# Patient Record
Sex: FEMALE | Race: WHITE | NOT HISPANIC OR LATINO | Employment: FULL TIME | URBAN - METROPOLITAN AREA
[De-identification: names, ages, dates, MRNs, and addresses within clinical notes are randomized per-mention and may not be internally consistent; named-entity substitution may affect disease eponyms.]

---

## 2017-01-16 LAB
EXTERNAL ABO GROUPING: NORMAL
EXTERNAL ANTIBODY SCREEN: NEGATIVE
EXTERNAL HEPATITIS B SURFACE ANTIGEN: NEGATIVE
EXTERNAL HEPATITIS C AB: NEGATIVE
EXTERNAL RH FACTOR: POSITIVE
EXTERNAL RUBELLA QUALITATIVE: NORMAL
EXTERNAL SYPHILIS RPR SCREEN: NORMAL
HIV1 AB SPEC QL IA.RAPID: NEGATIVE

## 2017-04-05 ENCOUNTER — TELEPHONE (OUTPATIENT)
Dept: INTERNAL MEDICINE | Facility: CLINIC | Age: 32
End: 2017-04-05

## 2017-04-05 RX ORDER — LABETALOL 200 MG/1
200 TABLET, FILM COATED ORAL 2 TIMES DAILY
Qty: 20 TABLET | Refills: 0 | Status: SHIPPED | OUTPATIENT
Start: 2017-04-05 | End: 2017-06-23 | Stop reason: SDUPTHER

## 2017-04-05 RX ORDER — LABETALOL 100 MG/1
100 TABLET, FILM COATED ORAL 2 TIMES DAILY
Qty: 20 TABLET | Refills: 0 | Status: SHIPPED | OUTPATIENT
Start: 2017-04-05 | End: 2017-06-23 | Stop reason: SDUPTHER

## 2017-04-05 NOTE — TELEPHONE ENCOUNTER
Meds sent to pharmacy, message sent to patient, she needs to make a follow up appointment with Dr. Pena.  ----- Message from Jill Stapleton sent at 4/5/2017  8:50 AM EDT -----  Regarding: DR JEAN PENA    labetalol (NORMODYNE) 100 MG tablet - take 1 by mouth twice daily  This is to be take in addition to the 200 mg bid that has already been prescribed for a total of 300mg bid    labetalol (NORMODYNE) 200 MG tablet - take 1 by mouth twice daily    Patient needs 10 days [facility] in [place] until mail order arrives.  Shoshana agreed to override.  She will contact pharmacy earlier in future, giving time for fill and mailing of script.    [phone]

## 2017-06-23 RX ORDER — LABETALOL 200 MG/1
TABLET, FILM COATED ORAL
Qty: 180 TABLET | Refills: 0 | Status: ON HOLD | OUTPATIENT
Start: 2017-06-23 | End: 2017-07-26

## 2017-06-23 RX ORDER — LABETALOL 100 MG/1
TABLET, FILM COATED ORAL
Qty: 180 TABLET | Refills: 0 | Status: SHIPPED | OUTPATIENT
Start: 2017-06-23 | End: 2017-07-26 | Stop reason: HOSPADM

## 2017-07-26 ENCOUNTER — APPOINTMENT (OUTPATIENT)
Dept: ULTRASOUND IMAGING | Facility: HOSPITAL | Age: 32
End: 2017-07-26

## 2017-07-26 ENCOUNTER — HOSPITAL ENCOUNTER (OUTPATIENT)
Facility: HOSPITAL | Age: 32
Setting detail: OBSERVATION
Discharge: HOME | End: 2017-07-26
Attending: OBSTETRICS & GYNECOLOGY | Admitting: OBSTETRICS & GYNECOLOGY

## 2017-07-26 VITALS
RESPIRATION RATE: 18 BRPM | HEART RATE: 94 BPM | DIASTOLIC BLOOD PRESSURE: 89 MMHG | SYSTOLIC BLOOD PRESSURE: 134 MMHG | TEMPERATURE: 99.2 F | HEIGHT: 67 IN

## 2017-07-26 PROBLEM — Z34.90 PREGNANCY: Status: ACTIVE | Noted: 2017-07-26

## 2017-07-26 LAB
ALBUMIN SERPL-MCNC: 3.7 G/DL (ref 3.5–5.2)
ALBUMIN/GLOB SERPL: 1.3 G/DL
ALP SERPL-CCNC: 80 U/L (ref 39–117)
ALT SERPL W P-5'-P-CCNC: 21 U/L (ref 1–33)
ANION GAP SERPL CALCULATED.3IONS-SCNC: 15.7 MMOL/L
AST SERPL-CCNC: 12 U/L (ref 1–32)
BILIRUB SERPL-MCNC: <0.2 MG/DL (ref 0.1–1.2)
BUN BLD-MCNC: 11 MG/DL (ref 6–20)
BUN/CREAT SERPL: 12.1 (ref 7–25)
CALCIUM SPEC-SCNC: 9.4 MG/DL (ref 8.6–10.5)
CHLORIDE SERPL-SCNC: 102 MMOL/L (ref 98–107)
CO2 SERPL-SCNC: 20.3 MMOL/L (ref 22–29)
CREAT BLD-MCNC: 0.91 MG/DL (ref 0.57–1)
DEPRECATED RDW RBC AUTO: 46.1 FL (ref 37–54)
ERYTHROCYTE [DISTWIDTH] IN BLOOD BY AUTOMATED COUNT: 14.1 % (ref 11.7–13)
EXPIRATION DATE: NORMAL
EXTERNAL GROUP B STREP ANTIGEN: NEGATIVE
GFR SERPL CREATININE-BSD FRML MDRD: 71 ML/MIN/1.73
GLOBULIN UR ELPH-MCNC: 2.9 GM/DL
GLUCOSE BLD-MCNC: 88 MG/DL (ref 65–99)
HCT VFR BLD AUTO: 35 % (ref 35.6–45.5)
HGB BLD-MCNC: 11.7 G/DL (ref 11.9–15.5)
Lab: NORMAL
MCH RBC QN AUTO: 29.8 PG (ref 26.9–32)
MCHC RBC AUTO-ENTMCNC: 33.4 G/DL (ref 32.4–36.3)
MCV RBC AUTO: 89.1 FL (ref 80.5–98.2)
PLATELET # BLD AUTO: 209 10*3/MM3 (ref 140–500)
PMV BLD AUTO: 9.5 FL (ref 6–12)
POTASSIUM BLD-SCNC: 4.1 MMOL/L (ref 3.5–5.2)
PROT SERPL-MCNC: 6.6 G/DL (ref 6–8.5)
PROT UR STRIP-MCNC: NORMAL MG/DL
RBC # BLD AUTO: 3.93 10*6/MM3 (ref 3.9–5.2)
SODIUM BLD-SCNC: 138 MMOL/L (ref 136–145)
WBC NRBC COR # BLD: 10.41 10*3/MM3 (ref 4.5–10.7)

## 2017-07-26 PROCEDURE — 80053 COMPREHEN METABOLIC PANEL: CPT | Performed by: OBSTETRICS & GYNECOLOGY

## 2017-07-26 PROCEDURE — G0378 HOSPITAL OBSERVATION PER HR: HCPCS

## 2017-07-26 PROCEDURE — 59025 FETAL NON-STRESS TEST: CPT

## 2017-07-26 PROCEDURE — 76819 FETAL BIOPHYS PROFIL W/O NST: CPT

## 2017-07-26 PROCEDURE — 85027 COMPLETE CBC AUTOMATED: CPT | Performed by: OBSTETRICS & GYNECOLOGY

## 2017-07-26 RX ORDER — LABETALOL 200 MG/1
400 TABLET, FILM COATED ORAL 2 TIMES DAILY
Qty: 180 TABLET | Refills: 0 | Status: ON HOLD | OUTPATIENT
Start: 2017-07-26 | End: 2017-08-27

## 2017-07-26 NOTE — NURSING NOTE
Discharge instructions reviewed with patient.  Discussed fetal kick counts, leaking of fluids, vaginal bleeding.  Patient to continue to go to all regularly scheduled appointments.  Dose of labetalol should be increased to 400mg BID.  CMP and CBC drawn.  Dr. Morfin will notify if abnormal.  To continue to monitor BPs at home.  Patient to return to L&D or call on call MD if any questions or concerns.

## 2017-07-26 NOTE — NON STRESS TEST
Shirin Hernandes, a  at 35w2d with an CHAPINCITO of 2017, by Patient Reported, was seen at [facility] for a nonstress test.    Chief Complaint   Patient presents with   • Non-stress Test     Dr. Morfin called patient in.  Patient was just in office and got a 4/8 on her BPP.  Dr. Morfin would like prolonged monitoring, and repeat BPP.

## 2017-07-26 NOTE — DISCHARGE SUMMARY
.[facility]  Obstetric History and Physical    Chief Complaint   Patient presents with   • Non-stress Test     Dr. Morfin called patient in.  Patient was just in office and got a 4/8 on her BPP.  Dr. Morfin would like prolonged monitoring, and repeat BPP.       Subjective     Patient is a 31 y.o. female  currently at 35w2d, who presents from office due to bpp 4/8. CHTN and routine weekly bpp 4/8. Reports good FM. No vb. No lof    CHTN- on labetolol 300mg bid. Takes bp at home and 130s/80s.... Here today 130-170/90s. No si/sx preeclampsia.    Ulcerative colitis. No LDA.            Past OB History:       Obstetric History       T1      TAB0   SAB1   E0   M0   L1       # Outcome Date GA Lbr Michael/2nd Weight Sex Delivery Anes PTL Lv   3 Current            2 Term 09/11/15 37w4d   M CS-LTranv   Y   1 SAB 14                  Past Medical History: Past Medical History:   Diagnosis Date   • Anxiety    • GERD (gastroesophageal reflux disease)    • Headache    • Hx of migraines    • Hypertension    • NSAID sensitivity     Cannot take NSAIDs while taking LIALDA   • Peptic ulceration    • Ulcerative colitis       Past Surgical History Past Surgical History:   Procedure Laterality Date   •  SECTION  2015   • CHOLECYSTECTOMY WITH INTRAOPERATIVE CHOLANGIOGRAM N/A 2016    Procedure: CHOLECYSTECTOMY LAPAROSCOPIC WITH INTRAOPERATIVE CHOLANGIOGRAM AND LIVER BIOPSY ;  Surgeon: Dieudonne Lamb MD;  [place] [facility] [place] [facility]  Service:    • COLONOSCOPY W/ BIOPSIES N/A 2016    Dr. Rom Wilks   • D&C CERVICAL BIOPSY  2014      Family History: Family History   Problem Relation Age of Onset   • Myocarditis Mother    • Hypertension Father    • Diabetes Maternal Uncle    • Aneurysm Paternal Aunt    • Diabetes Maternal Grandmother    • Diabetes Maternal Grandfather    • Alcohol abuse Paternal Grandfather    • Diabetes Maternal Uncle       Social History:  reports that she has never  smoked. She does not have any smokeless tobacco history on file.   reports that she does not drink alcohol.   reports that she does not use illicit drugs.    Allergies:     Amoxicillin and Benzoyl peroxide       Objective       Vital Signs Range for the last 24 hours  Temperature: Temp:  [99.2 °F (37.3 °C)] 99.2 °F (37.3 °C)   Temp Source: Temp src: Oral   BP: BP: (138-173)/(72-97) 148/92   Pulse: Heart Rate:  [84-98] 94   Respirations: Resp:  [18] 18                   Physical Examination:     General :  Alert in NAD  Abdomen: Gravid, nontender                            Fetal Heart Rate Assessment   Method: Fetal HR Assessment Method: external   Beats/min: Fetal HR (Beats/Min): 145   Baseline: Fetal HR Baseline: normal range (110-160 bpm)   Varibility: Fetal HR Variability: moderate (amplitude range 6 to 25 bpm)   Accels: Fetal HR Accelerations: greater than/equal to 15 bpm, lasting at least 15 seconds   Decels: Fetal HR Decelerations: absent   Tracing Category: Fetal HR Tracing Category: Category I     Uterine Assessment   Method: Method: TOCO (external toco transducer)   Frequency (min):     Ctx Count in 10 min:     Duration:     Intensity: Contraction Intensity: no contractions   Intensity by IUPC:     Resting Tone: Uterine Resting Tone: soft by palpation   Resting Tone by IUPC:     Abhay Units:         bpp in aimee 8/8 with reactive nst so 10/10      Assessment/Plan       Assessment:  1.  Intrauterine pregnancy at 35w2d weeks gestation with reassuring fetal status.    2.  bpp 4/8 in office--- here 10/10  3. CHTN with inc bp today. No si/sx preE.  Will inc to labetolol 400mg bid. Cont home bp monitors. Baseline cbc/cmp now. Keep f/u appt in office as scheduled    Plan:   Plan of care has been reviewed with patient and family,.   All questions answered.          Office prenatal reviewed        Jade Morfin MD  7/26/2017  6:18 PM

## 2017-08-24 ENCOUNTER — HOSPITAL ENCOUNTER (INPATIENT)
Facility: HOSPITAL | Age: 32
LOS: 3 days | Discharge: HOME | End: 2017-08-27
Attending: OBSTETRICS & GYNECOLOGY | Admitting: OBSTETRICS & GYNECOLOGY

## 2017-08-24 ENCOUNTER — ANESTHESIA (OUTPATIENT)
Dept: LABOR AND DELIVERY | Facility: HOSPITAL | Age: 32
End: 2017-08-24

## 2017-08-24 ENCOUNTER — ANESTHESIA EVENT (OUTPATIENT)
Dept: LABOR AND DELIVERY | Facility: HOSPITAL | Age: 32
End: 2017-08-24

## 2017-08-24 PROBLEM — O34.219 PREVIOUS CESAREAN DELIVERY, ANTEPARTUM: Status: ACTIVE | Noted: 2017-08-24

## 2017-08-24 LAB
ABO GROUP BLD: NORMAL
ATMOSPHERIC PRESS: 752.9 MMHG
BASE EXCESS BLDCOA CALC-SCNC: -2.4 MMOL/L
BASOPHILS # BLD AUTO: 0.02 10*3/MM3 (ref 0–0.2)
BASOPHILS NFR BLD AUTO: 0.2 % (ref 0–1.5)
BDY SITE: ABNORMAL
BLD GP AB SCN SERPL QL: NEGATIVE
DEPRECATED RDW RBC AUTO: 47.2 FL (ref 37–54)
EOSINOPHIL # BLD AUTO: 0.19 10*3/MM3 (ref 0–0.7)
EOSINOPHIL NFR BLD AUTO: 2 % (ref 0.3–6.2)
ERYTHROCYTE [DISTWIDTH] IN BLOOD BY AUTOMATED COUNT: 14.6 % (ref 11.7–13)
GAS FLOW AIRWAY: 2 LPM
HCO3 BLDCOA-SCNC: 25.4 MMOL/L (ref 22–28)
HCT VFR BLD AUTO: 37.2 % (ref 35.6–45.5)
HGB BLD-MCNC: 12.1 G/DL (ref 11.9–15.5)
IMM GRANULOCYTES # BLD: 0.11 10*3/MM3 (ref 0–0.03)
IMM GRANULOCYTES NFR BLD: 1.1 % (ref 0–0.5)
LYMPHOCYTES # BLD AUTO: 2.14 10*3/MM3 (ref 0.9–4.8)
LYMPHOCYTES NFR BLD AUTO: 22.2 % (ref 19.6–45.3)
MCH RBC QN AUTO: 29.2 PG (ref 26.9–32)
MCHC RBC AUTO-ENTMCNC: 32.5 G/DL (ref 32.4–36.3)
MCV RBC AUTO: 89.6 FL (ref 80.5–98.2)
MODALITY: ABNORMAL
MONOCYTES # BLD AUTO: 0.65 10*3/MM3 (ref 0.2–1.2)
MONOCYTES NFR BLD AUTO: 6.7 % (ref 5–12)
NEUTROPHILS # BLD AUTO: 6.53 10*3/MM3 (ref 1.9–8.1)
NEUTROPHILS NFR BLD AUTO: 67.8 % (ref 42.7–76)
PCO2 BLDCOA: 54.7 MMHG
PH BLDCOA: 7.28 [PH] (ref 7.18–7.34)
PLAT MORPH BLD: NORMAL
PLATELET # BLD AUTO: 226 10*3/MM3 (ref 140–500)
PMV BLD AUTO: 9.6 FL (ref 6–12)
PO2 BLDCOA: <25.2 MMHG (ref 12–26)
POC ALBUMIN: NEGATIVE
RBC # BLD AUTO: 4.15 10*6/MM3 (ref 3.9–5.2)
RBC MORPH BLD: NORMAL
RH BLD: POSITIVE
SAO2 % BLDCOA: 14.7 % (ref 92–99)
WBC MORPH BLD: NORMAL
WBC NRBC COR # BLD: 9.64 10*3/MM3 (ref 4.5–10.7)

## 2017-08-24 PROCEDURE — 86901 BLOOD TYPING SEROLOGIC RH(D): CPT | Performed by: OBSTETRICS & GYNECOLOGY

## 2017-08-24 PROCEDURE — 86850 RBC ANTIBODY SCREEN: CPT | Performed by: OBSTETRICS & GYNECOLOGY

## 2017-08-24 PROCEDURE — 25010000002 ONDANSETRON PER 1 MG: Performed by: ANESTHESIOLOGY

## 2017-08-24 PROCEDURE — 25010000002 PHENYLEPHRINE PER 1 ML: Performed by: NURSE ANESTHETIST, CERTIFIED REGISTERED

## 2017-08-24 PROCEDURE — 25010000002 MORPHINE PER 10 MG: Performed by: NURSE ANESTHETIST, CERTIFIED REGISTERED

## 2017-08-24 PROCEDURE — 81002 URINALYSIS NONAUTO W/O SCOPE: CPT | Performed by: OBSTETRICS & GYNECOLOGY

## 2017-08-24 PROCEDURE — 94799 UNLISTED PULMONARY SVC/PX: CPT

## 2017-08-24 PROCEDURE — 86900 BLOOD TYPING SEROLOGIC ABO: CPT | Performed by: OBSTETRICS & GYNECOLOGY

## 2017-08-24 PROCEDURE — 63710000001 DIPHENHYDRAMINE PER 50 MG: Performed by: ANESTHESIOLOGY

## 2017-08-24 PROCEDURE — 85007 BL SMEAR W/DIFF WBC COUNT: CPT | Performed by: OBSTETRICS & GYNECOLOGY

## 2017-08-24 PROCEDURE — 82803 BLOOD GASES ANY COMBINATION: CPT

## 2017-08-24 PROCEDURE — 10907ZC DRAINAGE OF AMNIOTIC FLUID, THERAPEUTIC FROM PRODUCTS OF CONCEPTION, VIA NATURAL OR ARTIFICIAL OPENING: ICD-10-PCS | Performed by: OBSTETRICS & GYNECOLOGY

## 2017-08-24 PROCEDURE — 85025 COMPLETE CBC W/AUTO DIFF WBC: CPT | Performed by: OBSTETRICS & GYNECOLOGY

## 2017-08-24 RX ORDER — ONDANSETRON 4 MG/1
4 TABLET, ORALLY DISINTEGRATING ORAL EVERY 6 HOURS PRN
Status: DISCONTINUED | OUTPATIENT
Start: 2017-08-24 | End: 2017-08-27 | Stop reason: HOSPADM

## 2017-08-24 RX ORDER — METHYLERGONOVINE MALEATE 0.2 MG/ML
200 INJECTION INTRAVENOUS AS NEEDED
Status: DISCONTINUED | OUTPATIENT
Start: 2017-08-24 | End: 2017-08-27 | Stop reason: HOSPADM

## 2017-08-24 RX ORDER — ACETAMINOPHEN 500 MG
1000 TABLET ORAL ONCE
Status: COMPLETED | OUTPATIENT
Start: 2017-08-24 | End: 2017-08-24

## 2017-08-24 RX ORDER — MISOPROSTOL 200 UG/1
800 TABLET ORAL AS NEEDED
Status: DISCONTINUED | OUTPATIENT
Start: 2017-08-24 | End: 2017-08-27 | Stop reason: HOSPADM

## 2017-08-24 RX ORDER — ERYTHROMYCIN 5 MG/G
OINTMENT OPHTHALMIC
Status: DISPENSED
Start: 2017-08-24 | End: 2017-08-24

## 2017-08-24 RX ORDER — FAMOTIDINE 10 MG/ML
20 INJECTION, SOLUTION INTRAVENOUS ONCE AS NEEDED
Status: COMPLETED | OUTPATIENT
Start: 2017-08-24 | End: 2017-08-24

## 2017-08-24 RX ORDER — MAGNESIUM OXIDE/MAG AA CHELATE 300 MG
300 CAPSULE ORAL
COMMUNITY
End: 2018-10-12

## 2017-08-24 RX ORDER — ACETAMINOPHEN 325 MG/1
650 TABLET ORAL EVERY 4 HOURS PRN
Status: DISCONTINUED | OUTPATIENT
Start: 2017-08-24 | End: 2017-08-27 | Stop reason: HOSPADM

## 2017-08-24 RX ORDER — MORPHINE SULFATE 2 MG/ML
2 INJECTION, SOLUTION INTRAMUSCULAR; INTRAVENOUS EVERY 4 HOURS PRN
Status: DISCONTINUED | OUTPATIENT
Start: 2017-08-24 | End: 2017-08-27 | Stop reason: HOSPADM

## 2017-08-24 RX ORDER — LABETALOL 200 MG/1
400 TABLET, FILM COATED ORAL 2 TIMES DAILY
Status: DISCONTINUED | OUTPATIENT
Start: 2017-08-24 | End: 2017-08-27 | Stop reason: HOSPADM

## 2017-08-24 RX ORDER — SODIUM CHLORIDE, SODIUM LACTATE, POTASSIUM CHLORIDE, CALCIUM CHLORIDE 600; 310; 30; 20 MG/100ML; MG/100ML; MG/100ML; MG/100ML
125 INJECTION, SOLUTION INTRAVENOUS CONTINUOUS
Status: DISCONTINUED | OUTPATIENT
Start: 2017-08-24 | End: 2017-08-26 | Stop reason: HOSPADM

## 2017-08-24 RX ORDER — CEFAZOLIN SODIUM IN 0.9 % NACL 3 G/100 ML
3 INTRAVENOUS SOLUTION, PIGGYBACK (ML) INTRAVENOUS ONCE
Status: COMPLETED | OUTPATIENT
Start: 2017-08-24 | End: 2017-08-24

## 2017-08-24 RX ORDER — OXYTOCIN/RINGER'S LACTATE 10/500ML
999 PLASTIC BAG, INJECTION (ML) INTRAVENOUS ONCE
Status: COMPLETED | OUTPATIENT
Start: 2017-08-24 | End: 2017-08-24

## 2017-08-24 RX ORDER — OXYTOCIN/RINGER'S LACTATE 10/500ML
125 PLASTIC BAG, INJECTION (ML) INTRAVENOUS CONTINUOUS PRN
Status: ACTIVE | OUTPATIENT
Start: 2017-08-24 | End: 2017-08-25

## 2017-08-24 RX ORDER — OXYCODONE HYDROCHLORIDE AND ACETAMINOPHEN 5; 325 MG/1; MG/1
1 TABLET ORAL EVERY 4 HOURS PRN
Status: DISCONTINUED | OUTPATIENT
Start: 2017-08-24 | End: 2017-08-27 | Stop reason: HOSPADM

## 2017-08-24 RX ORDER — ONDANSETRON 2 MG/ML
4 INJECTION INTRAMUSCULAR; INTRAVENOUS ONCE AS NEEDED
Status: DISCONTINUED | OUTPATIENT
Start: 2017-08-24 | End: 2017-08-27 | Stop reason: HOSPADM

## 2017-08-24 RX ORDER — DIPHENHYDRAMINE HCL 25 MG
25 CAPSULE ORAL EVERY 4 HOURS PRN
Status: DISCONTINUED | OUTPATIENT
Start: 2017-08-24 | End: 2017-08-27 | Stop reason: HOSPADM

## 2017-08-24 RX ORDER — ONDANSETRON 2 MG/ML
4 INJECTION INTRAMUSCULAR; INTRAVENOUS EVERY 6 HOURS PRN
Status: DISCONTINUED | OUTPATIENT
Start: 2017-08-24 | End: 2017-08-24 | Stop reason: SDUPTHER

## 2017-08-24 RX ORDER — FAMOTIDINE 20 MG/1
20 TABLET, FILM COATED ORAL ONCE AS NEEDED
Status: DISCONTINUED | OUTPATIENT
Start: 2017-08-24 | End: 2017-08-27 | Stop reason: HOSPADM

## 2017-08-24 RX ORDER — OXYTOCIN/RINGER'S LACTATE 10/500ML
999 PLASTIC BAG, INJECTION (ML) INTRAVENOUS ONCE
Status: DISCONTINUED | OUTPATIENT
Start: 2017-08-24 | End: 2017-08-27 | Stop reason: HOSPADM

## 2017-08-24 RX ORDER — ONDANSETRON 2 MG/ML
4 INJECTION INTRAMUSCULAR; INTRAVENOUS ONCE AS NEEDED
Status: DISCONTINUED | OUTPATIENT
Start: 2017-08-24 | End: 2017-08-24

## 2017-08-24 RX ORDER — DOCUSATE SODIUM 100 MG/1
100 CAPSULE, LIQUID FILLED ORAL 2 TIMES DAILY PRN
Status: DISCONTINUED | OUTPATIENT
Start: 2017-08-24 | End: 2017-08-27 | Stop reason: HOSPADM

## 2017-08-24 RX ORDER — DIPHENHYDRAMINE HCL 25 MG
25 CAPSULE ORAL EVERY 4 HOURS PRN
Status: DISCONTINUED | OUTPATIENT
Start: 2017-08-24 | End: 2017-08-24 | Stop reason: SDUPTHER

## 2017-08-24 RX ORDER — ONDANSETRON 4 MG/1
4 TABLET, ORALLY DISINTEGRATING ORAL EVERY 6 HOURS PRN
Status: DISCONTINUED | OUTPATIENT
Start: 2017-08-24 | End: 2017-08-24 | Stop reason: SDUPTHER

## 2017-08-24 RX ORDER — ONDANSETRON 2 MG/ML
4 INJECTION INTRAMUSCULAR; INTRAVENOUS EVERY 6 HOURS PRN
Status: DISCONTINUED | OUTPATIENT
Start: 2017-08-24 | End: 2017-08-27 | Stop reason: HOSPADM

## 2017-08-24 RX ORDER — LIDOCAINE HYDROCHLORIDE 10 MG/ML
5 INJECTION, SOLUTION INFILTRATION; PERINEURAL AS NEEDED
Status: DISCONTINUED | OUTPATIENT
Start: 2017-08-24 | End: 2017-08-24 | Stop reason: HOSPADM

## 2017-08-24 RX ORDER — ONDANSETRON 2 MG/ML
4 INJECTION INTRAMUSCULAR; INTRAVENOUS ONCE AS NEEDED
Status: COMPLETED | OUTPATIENT
Start: 2017-08-24 | End: 2017-08-24

## 2017-08-24 RX ORDER — EPHEDRINE SULFATE 50 MG/ML
INJECTION, SOLUTION INTRAVENOUS AS NEEDED
Status: DISCONTINUED | OUTPATIENT
Start: 2017-08-24 | End: 2017-08-24 | Stop reason: SURG

## 2017-08-24 RX ORDER — HYDROMORPHONE HYDROCHLORIDE 1 MG/ML
0.25 INJECTION, SOLUTION INTRAMUSCULAR; INTRAVENOUS; SUBCUTANEOUS
Status: ACTIVE | OUTPATIENT
Start: 2017-08-24 | End: 2017-08-25

## 2017-08-24 RX ORDER — DIPHENHYDRAMINE HYDROCHLORIDE 50 MG/ML
25 INJECTION INTRAMUSCULAR; INTRAVENOUS EVERY 4 HOURS PRN
Status: DISCONTINUED | OUTPATIENT
Start: 2017-08-24 | End: 2017-08-27 | Stop reason: HOSPADM

## 2017-08-24 RX ORDER — SODIUM CHLORIDE 0.9 % (FLUSH) 0.9 %
1-10 SYRINGE (ML) INJECTION AS NEEDED
Status: DISCONTINUED | OUTPATIENT
Start: 2017-08-24 | End: 2017-08-24 | Stop reason: HOSPADM

## 2017-08-24 RX ORDER — MORPHINE SULFATE 2 MG/ML
2 INJECTION, SOLUTION INTRAMUSCULAR; INTRAVENOUS
Status: ACTIVE | OUTPATIENT
Start: 2017-08-24 | End: 2017-08-25

## 2017-08-24 RX ORDER — PROMETHAZINE HYDROCHLORIDE 12.5 MG/1
12.5 TABLET ORAL EVERY 6 HOURS PRN
Status: DISCONTINUED | OUTPATIENT
Start: 2017-08-24 | End: 2017-08-27 | Stop reason: HOSPADM

## 2017-08-24 RX ORDER — ONDANSETRON 4 MG/1
4 TABLET, FILM COATED ORAL EVERY 6 HOURS PRN
Status: DISCONTINUED | OUTPATIENT
Start: 2017-08-24 | End: 2017-08-27 | Stop reason: HOSPADM

## 2017-08-24 RX ORDER — PROMETHAZINE HYDROCHLORIDE 25 MG/ML
12.5 INJECTION, SOLUTION INTRAMUSCULAR; INTRAVENOUS EVERY 6 HOURS PRN
Status: DISCONTINUED | OUTPATIENT
Start: 2017-08-24 | End: 2017-08-24 | Stop reason: SDUPTHER

## 2017-08-24 RX ORDER — ONDANSETRON 4 MG/1
4 TABLET, FILM COATED ORAL EVERY 6 HOURS PRN
Status: DISCONTINUED | OUTPATIENT
Start: 2017-08-24 | End: 2017-08-24 | Stop reason: SDUPTHER

## 2017-08-24 RX ORDER — SODIUM CHLORIDE 0.9 % (FLUSH) 0.9 %
1-10 SYRINGE (ML) INJECTION AS NEEDED
Status: DISCONTINUED | OUTPATIENT
Start: 2017-08-24 | End: 2017-08-27 | Stop reason: HOSPADM

## 2017-08-24 RX ORDER — IBUPROFEN 800 MG/1
800 TABLET ORAL EVERY 8 HOURS PRN
Status: DISCONTINUED | OUTPATIENT
Start: 2017-08-24 | End: 2017-08-24 | Stop reason: SDUPTHER

## 2017-08-24 RX ORDER — MORPHINE SULFATE 1 MG/ML
INJECTION, SOLUTION EPIDURAL; INTRATHECAL; INTRAVENOUS AS NEEDED
Status: DISCONTINUED | OUTPATIENT
Start: 2017-08-24 | End: 2017-08-24 | Stop reason: SURG

## 2017-08-24 RX ORDER — OXYTOCIN/RINGER'S LACTATE 10/500ML
125 PLASTIC BAG, INJECTION (ML) INTRAVENOUS CONTINUOUS PRN
Status: DISCONTINUED | OUTPATIENT
Start: 2017-08-24 | End: 2017-08-24 | Stop reason: HOSPADM

## 2017-08-24 RX ORDER — OXYCODONE HYDROCHLORIDE AND ACETAMINOPHEN 5; 325 MG/1; MG/1
2 TABLET ORAL EVERY 4 HOURS PRN
Status: DISCONTINUED | OUTPATIENT
Start: 2017-08-24 | End: 2017-08-27 | Stop reason: HOSPADM

## 2017-08-24 RX ORDER — IBUPROFEN 800 MG/1
800 TABLET ORAL EVERY 8 HOURS PRN
Status: DISCONTINUED | OUTPATIENT
Start: 2017-08-24 | End: 2017-08-24

## 2017-08-24 RX ORDER — MISOPROSTOL 200 UG/1
600 TABLET ORAL ONCE AS NEEDED
Status: DISCONTINUED | OUTPATIENT
Start: 2017-08-24 | End: 2017-08-24 | Stop reason: SDUPTHER

## 2017-08-24 RX ORDER — PROMETHAZINE HYDROCHLORIDE 25 MG/1
25 TABLET ORAL EVERY 6 HOURS PRN
Status: DISCONTINUED | OUTPATIENT
Start: 2017-08-24 | End: 2017-08-24 | Stop reason: SDUPTHER

## 2017-08-24 RX ORDER — BISACODYL 10 MG
10 SUPPOSITORY, RECTAL RECTAL DAILY PRN
Status: DISCONTINUED | OUTPATIENT
Start: 2017-08-24 | End: 2017-08-27 | Stop reason: HOSPADM

## 2017-08-24 RX ORDER — ACETAMINOPHEN 500 MG
1000 TABLET ORAL ONCE
Status: DISCONTINUED | OUTPATIENT
Start: 2017-08-24 | End: 2017-08-24

## 2017-08-24 RX ORDER — LANOLIN 100 %
OINTMENT (GRAM) TOPICAL
Status: DISCONTINUED | OUTPATIENT
Start: 2017-08-24 | End: 2017-08-27 | Stop reason: HOSPADM

## 2017-08-24 RX ORDER — NALOXONE HCL 0.4 MG/ML
0.2 VIAL (ML) INJECTION
Status: DISCONTINUED | OUTPATIENT
Start: 2017-08-24 | End: 2017-08-27 | Stop reason: HOSPADM

## 2017-08-24 RX ORDER — PROMETHAZINE HYDROCHLORIDE 25 MG/ML
12.5 INJECTION, SOLUTION INTRAMUSCULAR; INTRAVENOUS EVERY 6 HOURS PRN
Status: DISCONTINUED | OUTPATIENT
Start: 2017-08-24 | End: 2017-08-27 | Stop reason: HOSPADM

## 2017-08-24 RX ORDER — CARBOPROST TROMETHAMINE 250 UG/ML
250 INJECTION, SOLUTION INTRAMUSCULAR AS NEEDED
Status: DISCONTINUED | OUTPATIENT
Start: 2017-08-24 | End: 2017-08-27 | Stop reason: HOSPADM

## 2017-08-24 RX ORDER — NALOXONE HCL 0.4 MG/ML
0.4 VIAL (ML) INJECTION
Status: DISCONTINUED | OUTPATIENT
Start: 2017-08-24 | End: 2017-08-27 | Stop reason: HOSPADM

## 2017-08-24 RX ORDER — FAMOTIDINE 10 MG/ML
20 INJECTION, SOLUTION INTRAVENOUS ONCE AS NEEDED
Status: DISCONTINUED | OUTPATIENT
Start: 2017-08-24 | End: 2017-08-24 | Stop reason: SDUPTHER

## 2017-08-24 RX ORDER — PHYTONADIONE 2 MG/ML
INJECTION, EMULSION INTRAMUSCULAR; INTRAVENOUS; SUBCUTANEOUS
Status: DISPENSED
Start: 2017-08-24 | End: 2017-08-24

## 2017-08-24 RX ORDER — SIMETHICONE 80 MG
80 TABLET,CHEWABLE ORAL 4 TIMES DAILY PRN
Status: DISCONTINUED | OUTPATIENT
Start: 2017-08-24 | End: 2017-08-27 | Stop reason: HOSPADM

## 2017-08-24 RX ORDER — PROMETHAZINE HYDROCHLORIDE 12.5 MG/1
12.5 SUPPOSITORY RECTAL EVERY 6 HOURS PRN
Status: DISCONTINUED | OUTPATIENT
Start: 2017-08-24 | End: 2017-08-27 | Stop reason: HOSPADM

## 2017-08-24 RX ORDER — FAMOTIDINE 10 MG/ML
20 INJECTION, SOLUTION INTRAVENOUS ONCE AS NEEDED
Status: DISCONTINUED | OUTPATIENT
Start: 2017-08-24 | End: 2017-08-24

## 2017-08-24 RX ORDER — HYDROXYZINE 50 MG/1
50 TABLET, FILM COATED ORAL EVERY 6 HOURS PRN
Status: DISCONTINUED | OUTPATIENT
Start: 2017-08-24 | End: 2017-08-27 | Stop reason: HOSPADM

## 2017-08-24 RX ORDER — PROMETHAZINE HYDROCHLORIDE 12.5 MG/1
12.5 SUPPOSITORY RECTAL EVERY 6 HOURS PRN
Status: DISCONTINUED | OUTPATIENT
Start: 2017-08-24 | End: 2017-08-24 | Stop reason: SDUPTHER

## 2017-08-24 RX ORDER — MESALAMINE 1.2 G/1
1.2 TABLET, DELAYED RELEASE ORAL NIGHTLY
Status: DISCONTINUED | OUTPATIENT
Start: 2017-08-24 | End: 2017-08-25

## 2017-08-24 RX ADMIN — ACETAMINOPHEN 1000 MG: 500 TABLET ORAL at 09:56

## 2017-08-24 RX ADMIN — PHENYLEPHRINE HYDROCHLORIDE 50 MCG: 10 INJECTION INTRAVENOUS at 11:08

## 2017-08-24 RX ADMIN — EPHEDRINE SULFATE 10 MG: 50 INJECTION INTRAMUSCULAR; INTRAVENOUS; SUBCUTANEOUS at 11:03

## 2017-08-24 RX ADMIN — MESALAMINE 1.2 G: 1.2 TABLET, DELAYED RELEASE ORAL at 21:31

## 2017-08-24 RX ADMIN — OXYTOCIN 10 UNITS: 10 INJECTION, SOLUTION INTRAMUSCULAR; INTRAVENOUS at 11:17

## 2017-08-24 RX ADMIN — OXYCODONE HYDROCHLORIDE AND ACETAMINOPHEN 2 TABLET: 5; 325 TABLET ORAL at 22:35

## 2017-08-24 RX ADMIN — CEFAZOLIN 3 G: 1 INJECTION, POWDER, FOR SOLUTION INTRAMUSCULAR; INTRAVENOUS; PARENTERAL at 10:45

## 2017-08-24 RX ADMIN — SIMETHICONE CHEW TAB 80 MG 80 MG: 80 TABLET ORAL at 14:12

## 2017-08-24 RX ADMIN — DOCUSATE SODIUM 100 MG: 100 CAPSULE, LIQUID FILLED ORAL at 17:56

## 2017-08-24 RX ADMIN — SODIUM CHLORIDE, POTASSIUM CHLORIDE, SODIUM LACTATE AND CALCIUM CHLORIDE 125 ML/HR: 600; 310; 30; 20 INJECTION, SOLUTION INTRAVENOUS at 09:47

## 2017-08-24 RX ADMIN — SODIUM CHLORIDE, POTASSIUM CHLORIDE, SODIUM LACTATE AND CALCIUM CHLORIDE 1000 ML: 600; 310; 30; 20 INJECTION, SOLUTION INTRAVENOUS at 08:48

## 2017-08-24 RX ADMIN — PHENYLEPHRINE HYDROCHLORIDE 100 MCG: 10 INJECTION INTRAVENOUS at 11:01

## 2017-08-24 RX ADMIN — FAMOTIDINE 20 MG: 10 INJECTION INTRAVENOUS at 09:54

## 2017-08-24 RX ADMIN — DIPHENHYDRAMINE HYDROCHLORIDE 25 MG: 25 CAPSULE ORAL at 15:14

## 2017-08-24 RX ADMIN — OXYTOCIN 10 UNITS: 10 INJECTION, SOLUTION INTRAMUSCULAR; INTRAVENOUS at 11:38

## 2017-08-24 RX ADMIN — ONDANSETRON 4 MG: 2 INJECTION INTRAMUSCULAR; INTRAVENOUS at 09:51

## 2017-08-24 RX ADMIN — EPHEDRINE SULFATE 5 MG: 50 INJECTION INTRAMUSCULAR; INTRAVENOUS; SUBCUTANEOUS at 11:08

## 2017-08-24 RX ADMIN — MORPHINE SULFATE 0.2 MG: 1 INJECTION EPIDURAL; INTRATHECAL; INTRAVENOUS at 10:56

## 2017-08-24 RX ADMIN — PHENYLEPHRINE HYDROCHLORIDE 100 MCG: 10 INJECTION INTRAVENOUS at 10:57

## 2017-08-24 RX ADMIN — OXYCODONE HYDROCHLORIDE AND ACETAMINOPHEN 2 TABLET: 5; 325 TABLET ORAL at 14:13

## 2017-08-24 RX ADMIN — OXYCODONE HYDROCHLORIDE AND ACETAMINOPHEN 2 TABLET: 5; 325 TABLET ORAL at 17:57

## 2017-08-24 RX ADMIN — SODIUM CHLORIDE, POTASSIUM CHLORIDE, SODIUM LACTATE AND CALCIUM CHLORIDE: 600; 310; 30; 20 INJECTION, SOLUTION INTRAVENOUS at 10:49

## 2017-08-24 RX ADMIN — Medication: at 17:56

## 2017-08-24 RX ADMIN — LABETALOL HCL 400 MG: 200 TABLET, FILM COATED ORAL at 17:56

## 2017-08-24 NOTE — ANESTHESIA PROCEDURE NOTES
Spinal Block    Patient location during procedure: OR  Indication:at surgeon's request and procedure for pain  Performed By  Anesthesiologist: LUZ MARIA CHAPMAN  CRNA: MATT LOCKETT  Preanesthetic Checklist  Completed: patient identified, site marked, surgical consent, pre-op evaluation, timeout performed, IV checked, risks and benefits discussed and monitors and equipment checked  Spinal Block Prep:  Patient Position:sitting  Sterile Tech:cap, gloves, gown, mask and sterile barriers  Prep:Chloraprep  Patient Monitoring:blood pressure monitoring, continuous pulse oximetry and EKG  Spinal Block Procedure  Approach:midline  Guidance:landmark technique  Location:L3-L4  Needle Type:Pencan  Needle Gauge:24 G  Placement of Spinal needle event:cerebrospinal fluid aspirated    Fluid Appearance:clear  Post Assessment  Patient Tolerance:patient tolerated the procedure well with no apparent complications  Complications no

## 2017-08-24 NOTE — ANESTHESIA PREPROCEDURE EVALUATION
Anesthesia Evaluation     Patient summary reviewed and Nursing notes reviewed   no history of anesthetic complications:  NPO Solid Status: > 8 hours  NPO Liquid Status: > 2 hours     Airway   Mallampati: II  TM distance: >3 FB  Neck ROM: full  Dental - normal exam     Pulmonary - negative pulmonary ROS and normal exam   Cardiovascular - normal exam    (+) hypertension,       Neuro/Psych  (+) headaches, psychiatric history Anxiety,    GI/Hepatic/Renal/Endo    (+)  GERD, PUD,     Musculoskeletal     Abdominal    Substance History - negative use     OB/GYN    (+) Pregnant, pregnancy induced hypertension        Other - negative ROS                                       Anesthesia Plan    ASA 3     spinal     Anesthetic plan and risks discussed with patient.

## 2017-08-24 NOTE — ANESTHESIA POSTPROCEDURE EVALUATION
"Patient: Shirin Hernandes    Procedure Summary     Date Anesthesia Start Anesthesia Stop Room / Location    17 [id number]  [facility] /  [facility]       Procedure Diagnosis Surgeon Provider     SECTION REPEAT (N/A Abdomen) No diagnosis on file. MD Bob Pedraza MD          Anesthesia Type: spinal  Last vitals  BP        Temp        Pulse   74 (17 1300)   Resp   18 (17 1300)    SpO2   98 % (17 1300)      Post Anesthesia Care and Evaluation    Patient location during evaluation: bedside  Patient participation: complete - patient participated  Level of consciousness: awake  Pain score: 2  Pain management: adequate  Airway patency: patent  Anesthetic complications: No anesthetic complications  PONV Status: none  Cardiovascular status: acceptable  Respiratory status: acceptable  Hydration status: acceptable    Comments: /74  Pulse 74  Temp 36.4 °C (97.5 °F) (Oral)   Resp 18  Ht 67\" (170.2 cm)  Wt 275 lb (125 kg)  LMP 08/15/2016 (Approximate)  SpO2 98%  Breastfeeding? Yes  BMI 43.07 kg/m2        "

## 2017-08-25 PROBLEM — O10.919 CHRONIC HYPERTENSION IN PREGNANCY: Status: ACTIVE | Noted: 2017-08-25

## 2017-08-25 PROBLEM — Z34.90 PREGNANCY: Status: RESOLVED | Noted: 2017-07-26 | Resolved: 2017-08-25

## 2017-08-25 LAB
BASOPHILS # BLD AUTO: 0.02 10*3/MM3 (ref 0–0.2)
BASOPHILS NFR BLD AUTO: 0.2 % (ref 0–1.5)
DEPRECATED RDW RBC AUTO: 47.1 FL (ref 37–54)
EOSINOPHIL # BLD AUTO: 0.16 10*3/MM3 (ref 0–0.7)
EOSINOPHIL NFR BLD AUTO: 1.4 % (ref 0.3–6.2)
ERYTHROCYTE [DISTWIDTH] IN BLOOD BY AUTOMATED COUNT: 14.5 % (ref 11.7–13)
HCT VFR BLD AUTO: 36.3 % (ref 35.6–45.5)
HGB BLD-MCNC: 11.9 G/DL (ref 11.9–15.5)
IMM GRANULOCYTES # BLD: 0.06 10*3/MM3 (ref 0–0.03)
IMM GRANULOCYTES NFR BLD: 0.5 % (ref 0–0.5)
LYMPHOCYTES # BLD AUTO: 1.92 10*3/MM3 (ref 0.9–4.8)
LYMPHOCYTES NFR BLD AUTO: 17.3 % (ref 19.6–45.3)
MCH RBC QN AUTO: 29.5 PG (ref 26.9–32)
MCHC RBC AUTO-ENTMCNC: 32.8 G/DL (ref 32.4–36.3)
MCV RBC AUTO: 90.1 FL (ref 80.5–98.2)
MONOCYTES # BLD AUTO: 0.71 10*3/MM3 (ref 0.2–1.2)
MONOCYTES NFR BLD AUTO: 6.4 % (ref 5–12)
NEUTROPHILS # BLD AUTO: 8.2 10*3/MM3 (ref 1.9–8.1)
NEUTROPHILS NFR BLD AUTO: 74.2 % (ref 42.7–76)
PLATELET # BLD AUTO: 209 10*3/MM3 (ref 140–500)
PMV BLD AUTO: 9.7 FL (ref 6–12)
RBC # BLD AUTO: 4.03 10*6/MM3 (ref 3.9–5.2)
WBC NRBC COR # BLD: 11.07 10*3/MM3 (ref 4.5–10.7)

## 2017-08-25 PROCEDURE — 63710000001 DIPHENHYDRAMINE PER 50 MG: Performed by: ANESTHESIOLOGY

## 2017-08-25 PROCEDURE — 85025 COMPLETE CBC W/AUTO DIFF WBC: CPT | Performed by: OBSTETRICS & GYNECOLOGY

## 2017-08-25 RX ORDER — FAMOTIDINE 20 MG/1
20 TABLET, FILM COATED ORAL DAILY
Status: DISCONTINUED | OUTPATIENT
Start: 2017-08-25 | End: 2017-08-27 | Stop reason: HOSPADM

## 2017-08-25 RX ORDER — MESALAMINE 1.2 G/1
4.8 TABLET, DELAYED RELEASE ORAL NIGHTLY
Status: DISCONTINUED | OUTPATIENT
Start: 2017-08-25 | End: 2017-08-27 | Stop reason: HOSPADM

## 2017-08-25 RX ADMIN — SIMETHICONE CHEW TAB 80 MG 80 MG: 80 TABLET ORAL at 18:54

## 2017-08-25 RX ADMIN — OXYCODONE HYDROCHLORIDE AND ACETAMINOPHEN 2 TABLET: 5; 325 TABLET ORAL at 18:53

## 2017-08-25 RX ADMIN — MESALAMINE 4.8 G: 1.2 TABLET, DELAYED RELEASE ORAL at 21:12

## 2017-08-25 RX ADMIN — OXYCODONE HYDROCHLORIDE AND ACETAMINOPHEN 2 TABLET: 5; 325 TABLET ORAL at 10:39

## 2017-08-25 RX ADMIN — DIPHENHYDRAMINE HYDROCHLORIDE 25 MG: 25 CAPSULE ORAL at 23:45

## 2017-08-25 RX ADMIN — SIMETHICONE CHEW TAB 80 MG 80 MG: 80 TABLET ORAL at 14:39

## 2017-08-25 RX ADMIN — LABETALOL HCL 400 MG: 200 TABLET, FILM COATED ORAL at 18:53

## 2017-08-25 RX ADMIN — OXYCODONE HYDROCHLORIDE AND ACETAMINOPHEN 2 TABLET: 5; 325 TABLET ORAL at 02:31

## 2017-08-25 RX ADMIN — SIMETHICONE CHEW TAB 80 MG 80 MG: 80 TABLET ORAL at 10:38

## 2017-08-25 RX ADMIN — DOCUSATE SODIUM 100 MG: 100 CAPSULE, LIQUID FILLED ORAL at 08:42

## 2017-08-25 RX ADMIN — OXYCODONE HYDROCHLORIDE AND ACETAMINOPHEN 1 TABLET: 5; 325 TABLET ORAL at 23:45

## 2017-08-25 RX ADMIN — OXYCODONE HYDROCHLORIDE AND ACETAMINOPHEN 2 TABLET: 5; 325 TABLET ORAL at 14:40

## 2017-08-25 RX ADMIN — DOCUSATE SODIUM 100 MG: 100 CAPSULE, LIQUID FILLED ORAL at 18:54

## 2017-08-25 RX ADMIN — FAMOTIDINE 20 MG: 20 TABLET, FILM COATED ORAL at 10:38

## 2017-08-25 RX ADMIN — SIMETHICONE CHEW TAB 80 MG 80 MG: 80 TABLET ORAL at 23:45

## 2017-08-25 RX ADMIN — LABETALOL HCL 400 MG: 200 TABLET, FILM COATED ORAL at 08:41

## 2017-08-25 RX ADMIN — OXYCODONE HYDROCHLORIDE AND ACETAMINOPHEN 1 TABLET: 5; 325 TABLET ORAL at 06:35

## 2017-08-26 RX ADMIN — OXYCODONE HYDROCHLORIDE AND ACETAMINOPHEN 2 TABLET: 5; 325 TABLET ORAL at 12:13

## 2017-08-26 RX ADMIN — SIMETHICONE CHEW TAB 80 MG 80 MG: 80 TABLET ORAL at 18:49

## 2017-08-26 RX ADMIN — OXYCODONE HYDROCHLORIDE AND ACETAMINOPHEN 2 TABLET: 5; 325 TABLET ORAL at 07:52

## 2017-08-26 RX ADMIN — OXYCODONE HYDROCHLORIDE AND ACETAMINOPHEN 1 TABLET: 5; 325 TABLET ORAL at 16:42

## 2017-08-26 RX ADMIN — OXYCODONE HYDROCHLORIDE AND ACETAMINOPHEN 1 TABLET: 5; 325 TABLET ORAL at 03:34

## 2017-08-26 RX ADMIN — OXYCODONE HYDROCHLORIDE AND ACETAMINOPHEN 2 TABLET: 5; 325 TABLET ORAL at 20:52

## 2017-08-26 RX ADMIN — LABETALOL HCL 400 MG: 200 TABLET, FILM COATED ORAL at 09:18

## 2017-08-26 RX ADMIN — MESALAMINE 4.8 G: 1.2 TABLET, DELAYED RELEASE ORAL at 20:52

## 2017-08-26 RX ADMIN — LABETALOL HCL 400 MG: 200 TABLET, FILM COATED ORAL at 18:49

## 2017-08-26 RX ADMIN — FAMOTIDINE 20 MG: 20 TABLET, FILM COATED ORAL at 09:18

## 2017-08-27 VITALS
HEART RATE: 83 BPM | WEIGHT: 275 LBS | HEIGHT: 67 IN | TEMPERATURE: 98.2 F | BODY MASS INDEX: 43.16 KG/M2 | RESPIRATION RATE: 18 BRPM | DIASTOLIC BLOOD PRESSURE: 78 MMHG | OXYGEN SATURATION: 98 % | SYSTOLIC BLOOD PRESSURE: 130 MMHG

## 2017-08-27 PROCEDURE — 63710000001 DIPHENHYDRAMINE PER 50 MG: Performed by: ANESTHESIOLOGY

## 2017-08-27 RX ORDER — LABETALOL 200 MG/1
400 TABLET, FILM COATED ORAL 2 TIMES DAILY
Qty: 180 TABLET | Refills: 0 | Status: SHIPPED | OUTPATIENT
Start: 2017-08-27 | End: 2017-11-27 | Stop reason: SDUPTHER

## 2017-08-27 RX ORDER — OXYCODONE HYDROCHLORIDE AND ACETAMINOPHEN 5; 325 MG/1; MG/1
2 TABLET ORAL EVERY 4 HOURS PRN
Qty: 24 TABLET | Refills: 0 | Status: SHIPPED | OUTPATIENT
Start: 2017-08-27 | End: 2017-09-03

## 2017-08-27 RX ADMIN — DIPHENHYDRAMINE HYDROCHLORIDE 25 MG: 25 CAPSULE ORAL at 01:21

## 2017-08-27 RX ADMIN — OXYCODONE HYDROCHLORIDE AND ACETAMINOPHEN 1 TABLET: 5; 325 TABLET ORAL at 09:37

## 2017-08-27 RX ADMIN — FAMOTIDINE 20 MG: 20 TABLET, FILM COATED ORAL at 09:37

## 2017-08-27 RX ADMIN — OXYCODONE HYDROCHLORIDE AND ACETAMINOPHEN 1 TABLET: 5; 325 TABLET ORAL at 04:08

## 2017-08-27 RX ADMIN — LABETALOL HCL 400 MG: 200 TABLET, FILM COATED ORAL at 09:37

## 2017-08-27 RX ADMIN — OXYCODONE HYDROCHLORIDE AND ACETAMINOPHEN 1 TABLET: 5; 325 TABLET ORAL at 13:46

## 2017-09-20 ENCOUNTER — HOSPITAL ENCOUNTER (OUTPATIENT)
Dept: LACTATION | Facility: HOSPITAL | Age: 32
Discharge: HOME | End: 2017-09-20

## 2017-09-20 NOTE — LACTATION NOTE
17 (3405g) BW 7-8.p  Wt at ped office on 17 was 7-5  Today's wt after he BF for 10 min 7-13.2 (3550g) and then 7-14.8 (3596g) after finishing feeding  After 10 on right and 10 on left baby transferred 1.6 oz  Mom will pump 1-2 times a day and feed back her breast milk. Ped froilan. On monday

## 2017-09-21 ENCOUNTER — TELEPHONE (OUTPATIENT)
Dept: INTERNAL MEDICINE | Facility: CLINIC | Age: 32
End: 2017-09-21

## 2017-09-21 NOTE — TELEPHONE ENCOUNTER
Last Thyroid labs were drawn in August of 2016. Attempted to notify patient, no answer, left message on voicemail.   ----- Message from Trice Ames MA sent at 9/20/2017  4:36 PM EDT -----  Regarding: FW: THYROID TEST RESULTS  Contact: [phone]      ----- Message -----     From: Umu Morillo     Sent: 9/20/2017   4:05 PM       To: [facility] Clinical Pool  Subject: THYROID TEST RESULTS                             JEAN    PT IS REQUESTING TO KNOW IF SHE HAD THYROID CHECKED ON PREVIOUS LABS, (SHE THOUGHT IT MIGHT HAVE BEEN IN 2016).   PLEASE CALL PT BACK [phone]

## 2017-11-27 RX ORDER — LABETALOL 100 MG/1
TABLET, FILM COATED ORAL
Qty: 180 TABLET | Refills: 0 | Status: SHIPPED | OUTPATIENT
Start: 2017-11-27 | End: 2017-11-29 | Stop reason: SDUPTHER

## 2017-11-27 RX ORDER — LABETALOL 200 MG/1
TABLET, FILM COATED ORAL
Qty: 180 TABLET | Refills: 0 | Status: SHIPPED | OUTPATIENT
Start: 2017-11-27 | End: 2017-11-29 | Stop reason: SDUPTHER

## 2017-11-29 RX ORDER — LABETALOL 200 MG/1
200 TABLET, FILM COATED ORAL 2 TIMES DAILY
Qty: 60 TABLET | Refills: 0 | Status: SHIPPED | OUTPATIENT
Start: 2017-11-29 | End: 2018-04-18 | Stop reason: SDUPTHER

## 2017-11-29 RX ORDER — LABETALOL 100 MG/1
100 TABLET, FILM COATED ORAL 2 TIMES DAILY
Qty: 60 TABLET | Refills: 0 | Status: SHIPPED | OUTPATIENT
Start: 2017-11-29 | End: 2018-04-18 | Stop reason: SDUPTHER

## 2018-04-18 RX ORDER — LABETALOL 100 MG/1
TABLET, FILM COATED ORAL
Qty: 180 TABLET | Refills: 1 | Status: SHIPPED | OUTPATIENT
Start: 2018-04-18 | End: 2018-10-12 | Stop reason: DRUGHIGH

## 2018-04-18 RX ORDER — LABETALOL 200 MG/1
TABLET, FILM COATED ORAL
Qty: 180 TABLET | Refills: 0 | Status: SHIPPED | OUTPATIENT
Start: 2018-04-18 | End: 2018-10-12 | Stop reason: DRUGHIGH

## 2018-08-03 RX ORDER — LABETALOL 200 MG/1
TABLET, FILM COATED ORAL
Qty: 180 TABLET | Refills: 0 | OUTPATIENT
Start: 2018-08-03

## 2018-08-28 ENCOUNTER — TELEPHONE (OUTPATIENT)
Dept: INTERNAL MEDICINE | Facility: CLINIC | Age: 33
End: 2018-08-28

## 2018-08-28 DIAGNOSIS — Z00.00 ROUTINE ADULT HEALTH MAINTENANCE: Primary | ICD-10-CM

## 2018-08-28 NOTE — TELEPHONE ENCOUNTER
Patient scheduled on 9/14 with Dr. Duran and is going to get lab @ [facility] or [facility] at least 2 days prior to appt.  Orders entered    ----- Message from Ariana Duran MD sent at 8/28/2018  1:28 PM EDT -----  Need blood work before as well. CMP, CBC, lipid, UA with reflex, TSH with reflex. Has not had blood work in one year.     ----- Message -----  From: Trice Ames MA  Sent: 8/28/2018   1:23 PM  To: Ariana Duran MD    I looked at everyone's schedule, and you have the most time available on 9/24.  Would you be willing to do this?  ----- Message -----  From: Ariana Russell  Sent: 8/28/2018  12:52 PM  To: Trice Ames MA    States she hasnt been to obgyn in a while so they wont refill.   She originally called wanting a physical in November, has one set for 11/2. This is more than 2 months out. She is only off on 9/14 and jean is out that day. Last OV was 04/2016.   ----- Message -----  From: Trice Ames MA  Sent: 8/28/2018  11:53 AM  To: Ariana Russell    Can we just schedule her at the next available and her GYN refill until then?  ----- Message -----  From: Ariana Russell  Sent: 8/28/2018  11:26 AM  To: [facility] [place]    PT HAS NOT BEEN SEEN IN 2 1/2 YRS. SHE IS WANTING A REFILL ON BP MEDS BUT WANTS AN APPT ON 9/14. JEAN IS NOT HERE THIS DAY. SHE WANTS TO KNOW IF SHE CAN SEE SOMEONE ELSE. HAS BEEN GETTING THIS REFILLED THROUGH OBGYN SINCE SHE HAS HAD TWO PREGNANCIES WHILE NOT SEEN HERE. CALL BACK [phone].

## 2018-08-28 NOTE — TELEPHONE ENCOUNTER
Patient scheduled on 9/14 with Dr. Duran and is going to get lab @ [facility] or [facility] at least 2 days prior to appt.  Orders entered.  ----- Message from Ariana Duran MD sent at 8/28/2018  1:28 PM EDT -----  Sure, I can see her.   ----- Message -----  From: Trice Amse MA  Sent: 8/28/2018   1:23 PM  To: Ariana Duran MD    I looked at everyone's schedule, and you have the most time available on 9/24.  Would you be willing to do this?  ----- Message -----  From: Ariana Russell  Sent: 8/28/2018  12:52 PM  To: Trice Ames MA    States she hasnt been to obgyn in a while so they wont refill.   She originally called wanting a physical in November, has one set for 11/2. This is more than 2 months out. She is only off on 9/14 and jean is out that day. Last OV was 04/2016.   ----- Message -----  From: Trice Ames MA  Sent: 8/28/2018  11:53 AM  To: Ariana Russell    Can we just schedule her at the next available and her GYN refill until then?  ----- Message -----  From: Ariana Russell  Sent: 8/28/2018  11:26 AM  To: k [facility] Clinical Pool    PT HAS NOT BEEN SEEN IN 2 1/2 YRS. SHE IS WANTING A REFILL ON BP MEDS BUT WANTS AN APPT ON 9/14. JEAN IS NOT HERE THIS DAY. SHE WANTS TO KNOW IF SHE CAN SEE SOMEONE ELSE. HAS BEEN GETTING THIS REFILLED THROUGH OBGYN SINCE SHE HAS HAD TWO PREGNANCIES WHILE NOT SEEN HERE. CALL BACK [phone].

## 2018-09-07 ENCOUNTER — LAB (OUTPATIENT)
Dept: LAB | Facility: HOSPITAL | Age: 33
End: 2018-09-07

## 2018-09-07 DIAGNOSIS — Z00.00 ROUTINE ADULT HEALTH MAINTENANCE: ICD-10-CM

## 2018-09-07 LAB
ALBUMIN SERPL-MCNC: 4.3 G/DL (ref 3.5–5.2)
ALBUMIN/GLOB SERPL: 1.4 G/DL
ALP SERPL-CCNC: 105 U/L (ref 39–117)
ALT SERPL W P-5'-P-CCNC: 31 U/L (ref 1–33)
ANION GAP SERPL CALCULATED.3IONS-SCNC: 12.7 MMOL/L
AST SERPL-CCNC: 19 U/L (ref 1–32)
BACTERIA UR QL AUTO: ABNORMAL /HPF
BASOPHILS # BLD AUTO: 0.04 10*3/MM3 (ref 0–0.2)
BASOPHILS NFR BLD AUTO: 0.6 % (ref 0–1.5)
BILIRUB SERPL-MCNC: 0.5 MG/DL (ref 0.1–1.2)
BILIRUB UR QL STRIP: NEGATIVE
BUN BLD-MCNC: 16 MG/DL (ref 6–20)
BUN/CREAT SERPL: 19.3 (ref 7–25)
CALCIUM SPEC-SCNC: 9.5 MG/DL (ref 8.6–10.5)
CHLORIDE SERPL-SCNC: 106 MMOL/L (ref 98–107)
CHOLEST SERPL-MCNC: 161 MG/DL (ref 0–200)
CLARITY UR: CLEAR
CO2 SERPL-SCNC: 22.3 MMOL/L (ref 22–29)
COLOR UR: YELLOW
CREAT BLD-MCNC: 0.83 MG/DL (ref 0.57–1)
DEPRECATED RDW RBC AUTO: 40.3 FL (ref 37–54)
EOSINOPHIL # BLD AUTO: 0.31 10*3/MM3 (ref 0–0.7)
EOSINOPHIL NFR BLD AUTO: 5 % (ref 0.3–6.2)
ERYTHROCYTE [DISTWIDTH] IN BLOOD BY AUTOMATED COUNT: 13.2 % (ref 11.7–13)
GFR SERPL CREATININE-BSD FRML MDRD: 79 ML/MIN/1.73
GLOBULIN UR ELPH-MCNC: 3.1 GM/DL
GLUCOSE BLD-MCNC: 105 MG/DL (ref 65–99)
GLUCOSE UR STRIP-MCNC: NEGATIVE MG/DL
HCT VFR BLD AUTO: 42.1 % (ref 35.6–45.5)
HDLC SERPL QL: 3.58
HDLC SERPL-MCNC: 45 MG/DL (ref 40–60)
HGB BLD-MCNC: 14 G/DL (ref 11.9–15.5)
HGB UR QL STRIP.AUTO: ABNORMAL
HYALINE CASTS UR QL AUTO: ABNORMAL /LPF
IMM GRANULOCYTES # BLD: 0.01 10*3/MM3 (ref 0–0.03)
IMM GRANULOCYTES NFR BLD: 0.2 % (ref 0–0.5)
KETONES UR QL STRIP: NEGATIVE
LDLC SERPL CALC-MCNC: 98 MG/DL (ref 0–100)
LEUKOCYTE ESTERASE UR QL STRIP.AUTO: NEGATIVE
LYMPHOCYTES # BLD AUTO: 1.92 10*3/MM3 (ref 0.9–4.8)
LYMPHOCYTES NFR BLD AUTO: 30.9 % (ref 19.6–45.3)
MCH RBC QN AUTO: 27.8 PG (ref 26.9–32)
MCHC RBC AUTO-ENTMCNC: 33.3 G/DL (ref 32.4–36.3)
MCV RBC AUTO: 83.7 FL (ref 80.5–98.2)
MONOCYTES # BLD AUTO: 0.4 10*3/MM3 (ref 0.2–1.2)
MONOCYTES NFR BLD AUTO: 6.4 % (ref 5–12)
NEUTROPHILS # BLD AUTO: 3.53 10*3/MM3 (ref 1.9–8.1)
NEUTROPHILS NFR BLD AUTO: 56.9 % (ref 42.7–76)
NITRITE UR QL STRIP: NEGATIVE
NRBC BLD MANUAL-RTO: 0 /100 WBC (ref 0–0)
PH UR STRIP.AUTO: 6 [PH] (ref 5–8)
PLATELET # BLD AUTO: 256 10*3/MM3 (ref 140–500)
PMV BLD AUTO: 9 FL (ref 6–12)
POTASSIUM BLD-SCNC: 3.9 MMOL/L (ref 3.5–5.2)
PROT SERPL-MCNC: 7.4 G/DL (ref 6–8.5)
PROT UR QL STRIP: NEGATIVE
RBC # BLD AUTO: 5.03 10*6/MM3 (ref 3.9–5.2)
RBC # UR: ABNORMAL /HPF
REF LAB TEST METHOD: ABNORMAL
SODIUM BLD-SCNC: 141 MMOL/L (ref 136–145)
SP GR UR STRIP: 1.02 (ref 1–1.03)
SQUAMOUS #/AREA URNS HPF: ABNORMAL /HPF
TRIGL SERPL-MCNC: 90 MG/DL (ref 0–150)
TSH SERPL DL<=0.05 MIU/L-ACNC: 1.32 MIU/ML (ref 0.27–4.2)
UROBILINOGEN UR QL STRIP: ABNORMAL
VLDLC SERPL-MCNC: 18 MG/DL (ref 5–40)
WBC NRBC COR # BLD: 6.21 10*3/MM3 (ref 4.5–10.7)
WBC UR QL AUTO: ABNORMAL /HPF

## 2018-09-07 PROCEDURE — 80053 COMPREHEN METABOLIC PANEL: CPT

## 2018-09-07 PROCEDURE — 85025 COMPLETE CBC W/AUTO DIFF WBC: CPT

## 2018-09-07 PROCEDURE — 84443 ASSAY THYROID STIM HORMONE: CPT | Performed by: INTERNAL MEDICINE

## 2018-09-07 PROCEDURE — 81001 URINALYSIS AUTO W/SCOPE: CPT | Performed by: INTERNAL MEDICINE

## 2018-09-07 PROCEDURE — 80061 LIPID PANEL: CPT | Performed by: INTERNAL MEDICINE

## 2018-09-07 PROCEDURE — 36415 COLL VENOUS BLD VENIPUNCTURE: CPT

## 2018-09-20 ENCOUNTER — TELEPHONE (OUTPATIENT)
Dept: INTERNAL MEDICINE | Facility: CLINIC | Age: 33
End: 2018-09-20

## 2018-09-20 NOTE — TELEPHONE ENCOUNTER
----- Message from Felipe Cain sent at 9/20/2018  9:50 AM EDT -----  Regarding: [employer] FORM  JEAN PT    Patient called to say that she has an appointment scheduled for a physical with dr anna in October, but the form that she has for this needs to be filled out by the end of the month. She wanted to see if she could see someone else here to get it filled out sooner.  Per sushma, I told her that if she had it done here, dr anna would have to do it. I suggested that she go to [facility] [employer].    felipe

## 2018-10-12 ENCOUNTER — OFFICE VISIT (OUTPATIENT)
Dept: INTERNAL MEDICINE | Facility: CLINIC | Age: 33
End: 2018-10-12

## 2018-10-12 VITALS
HEART RATE: 67 BPM | HEIGHT: 68 IN | WEIGHT: 240.2 LBS | OXYGEN SATURATION: 99 % | SYSTOLIC BLOOD PRESSURE: 118 MMHG | DIASTOLIC BLOOD PRESSURE: 82 MMHG | BODY MASS INDEX: 36.4 KG/M2

## 2018-10-12 DIAGNOSIS — R73.01 IMPAIRED FASTING GLUCOSE: Primary | ICD-10-CM

## 2018-10-12 DIAGNOSIS — K52.9 COLITIS: ICD-10-CM

## 2018-10-12 DIAGNOSIS — R73.03 PREDIABETES: ICD-10-CM

## 2018-10-12 DIAGNOSIS — O10.919 CHRONIC HYPERTENSION IN PREGNANCY: ICD-10-CM

## 2018-10-12 DIAGNOSIS — Z80.3 FAMILY HISTORY OF BREAST CANCER: ICD-10-CM

## 2018-10-12 DIAGNOSIS — G43.809 OTHER MIGRAINE WITHOUT STATUS MIGRAINOSUS, NOT INTRACTABLE: ICD-10-CM

## 2018-10-12 LAB
EXPIRATION DATE: ABNORMAL
HBA1C MFR BLD: 6.3 %
Lab: ABNORMAL

## 2018-10-12 PROCEDURE — 83036 HEMOGLOBIN GLYCOSYLATED A1C: CPT | Performed by: INTERNAL MEDICINE

## 2018-10-12 PROCEDURE — 99214 OFFICE O/P EST MOD 30 MIN: CPT | Performed by: INTERNAL MEDICINE

## 2018-10-12 RX ORDER — MESALAMINE 1.2 G/1
4.8 TABLET, DELAYED RELEASE ORAL ONCE
COMMUNITY
Start: 2018-09-28 | End: 2018-10-12 | Stop reason: SDUPTHER

## 2018-10-12 RX ORDER — OMEPRAZOLE 20 MG/1
20 CAPSULE, DELAYED RELEASE ORAL DAILY
COMMUNITY

## 2018-10-12 RX ORDER — MESALAMINE 1.2 G/1
4.8 TABLET, DELAYED RELEASE ORAL DAILY
Qty: 360 TABLET | Refills: 3 | Status: SHIPPED | OUTPATIENT
Start: 2018-10-12 | End: 2018-10-19 | Stop reason: SDUPTHER

## 2018-10-12 RX ORDER — ERGOCALCIFEROL (VITAMIN D2) 10 MCG
400 TABLET ORAL DAILY
COMMUNITY
End: 2019-07-18 | Stop reason: ALTCHOICE

## 2018-10-12 RX ORDER — SUMATRIPTAN 100 MG/1
100 TABLET, FILM COATED ORAL ONCE
Qty: 27 TABLET | Refills: 1 | Status: SHIPPED | OUTPATIENT
Start: 2018-10-12 | End: 2018-10-12

## 2018-10-12 NOTE — PROGRESS NOTES
Shirin Hernandes is a 32 y.o. female, who presents with a chief complaint of   Chief Complaint   Patient presents with   • Follow-up     Had labs done recently   • Ulcerative Colitis       HPI   Pt here for follow up.  She hasnt been here since 2016.  Since then she has had a baby.  He is now 1 year old.  They also have an older son that is 3 (ed).  She and her  are building a house.  They have been on a diet and have lost 20 pounds.  She has been tracking on my fitness pal.    Pt has had htn/PIH.  She is on labetalol still.  No ha/dizziness.      UC - she has been on mesalamine and is flare free.      Hyperglycemia - pt passed glucose tolerance test but now a1c 6.3 today.     gerd - pt taking omeprazole daily.     She has been having some migraines now that she is having periods again.  She was on imitrex in past and it helped.  She is not on birth control.  She is weaning her son from nursing.     Pt's mgm had breast cancer.  Pt's mom  at age 37 of myocarditis.  Pt is doing self exams   The following portions of the patient's history were reviewed and updated as appropriate: allergies, current medications, past family history, past medical history, past social history, past surgical history and problem list.    Allergies: Amoxicillin and Benzoyl peroxide    Review of Systems   Constitutional: Negative.    HENT: Negative.    Eyes: Negative.    Respiratory: Negative.    Cardiovascular: Negative.    Gastrointestinal: Negative.    Endocrine: Negative.    Genitourinary: Negative.    Musculoskeletal: Negative.    Skin: Negative.    Allergic/Immunologic: Negative.    Neurological: Negative.    Hematological: Negative.    Psychiatric/Behavioral: Negative.    All other systems reviewed and are negative.            Wt Readings from Last 3 Encounters:   10/12/18 109 kg (240 lb 3.2 oz)   17 125 kg (275 lb)   16 109 kg (240 lb)     Temp Readings from Last 3 Encounters:   17 98.2  °F (36.8 °C) (Oral)   07/26/17 99.2 °F (37.3 °C) (Oral)   08/26/16 98 °F (36.7 °C) (Oral)     BP Readings from Last 3 Encounters:   10/12/18 118/82   08/27/17 130/78   07/26/17 134/89     Pulse Readings from Last 3 Encounters:   10/12/18 67   08/27/17 83   07/26/17 94     Body mass index is 37.07 kg/m².  @LASTSAO2(3)@    Physical Exam   Constitutional: She is oriented to person, place, and time. She appears well-developed and well-nourished. No distress.   HENT:   Head: Normocephalic and atraumatic.   Right Ear: External ear normal.   Left Ear: External ear normal.   Nose: Nose normal.   Mouth/Throat: Oropharynx is clear and moist.   Eyes: Pupils are equal, round, and reactive to light. Conjunctivae and EOM are normal.   Neck: Normal range of motion. Neck supple.   Cardiovascular: Normal rate, regular rhythm, normal heart sounds and intact distal pulses.    Pulmonary/Chest: Effort normal and breath sounds normal. No respiratory distress. She has no wheezes.   Musculoskeletal: Normal range of motion.   Normal gait   Neurological: She is alert and oriented to person, place, and time.   Skin: Skin is warm and dry.   Psychiatric: She has a normal mood and affect. Her behavior is normal. Judgment and thought content normal.   Nursing note and vitals reviewed.      Results for orders placed or performed in visit on 09/07/18   Lipid Panel With / Chol / HDL Ratio   Result Value Ref Range    Total Cholesterol 161 0 - 200 mg/dL    Triglycerides 90 0 - 150 mg/dL    HDL Cholesterol 45 40 - 60 mg/dL    LDL Cholesterol  98 0 - 100 mg/dL    VLDL Cholesterol 18 5 - 40 mg/dL    Chol/HDL Ratio 3.58    Comprehensive metabolic panel   Result Value Ref Range    Glucose 105 (H) 65 - 99 mg/dL    BUN 16 6 - 20 mg/dL    Creatinine 0.83 0.57 - 1.00 mg/dL    Sodium 141 136 - 145 mmol/L    Potassium 3.9 3.5 - 5.2 mmol/L    Chloride 106 98 - 107 mmol/L    CO2 22.3 22.0 - 29.0 mmol/L    Calcium 9.5 8.6 - 10.5 mg/dL    Total Protein 7.4 6.0 - 8.5  g/dL    Albumin 4.30 3.50 - 5.20 g/dL    ALT (SGPT) 31 1 - 33 U/L    AST (SGOT) 19 1 - 32 U/L    Alkaline Phosphatase 105 39 - 117 U/L    Total Bilirubin 0.5 0.1 - 1.2 mg/dL    eGFR Non African Amer 79 >60 mL/min/1.73    Globulin 3.1 gm/dL    A/G Ratio 1.4 g/dL    BUN/Creatinine Ratio 19.3 7.0 - 25.0    Anion Gap 12.7 mmol/L   TSH Rfx On Abnormal To Free T4   Result Value Ref Range    TSH 1.320 0.270 - 4.200 mIU/mL   Urinalysis With Culture If Indicated - Urine, Clean Catch   Result Value Ref Range    Color, UA Yellow Yellow, Straw    Appearance, UA Clear Clear    pH, UA 6.0 5.0 - 8.0    Specific Gravity, UA 1.020 1.005 - 1.030    Glucose, UA Negative Negative    Ketones, UA Negative Negative    Bilirubin, UA Negative Negative    Blood, UA Trace (A) Negative    Protein, UA Negative Negative    Leuk Esterase, UA Negative Negative    Nitrite, UA Negative Negative    Urobilinogen, UA 0.2 E.U./dL 0.2 - 1.0 E.U./dL   CBC Auto Differential   Result Value Ref Range    WBC 6.21 4.50 - 10.70 10*3/mm3    RBC 5.03 3.90 - 5.20 10*6/mm3    Hemoglobin 14.0 11.9 - 15.5 g/dL    Hematocrit 42.1 35.6 - 45.5 %    MCV 83.7 80.5 - 98.2 fL    MCH 27.8 26.9 - 32.0 pg    MCHC 33.3 32.4 - 36.3 g/dL    RDW 13.2 (H) 11.7 - 13.0 %    RDW-SD 40.3 37.0 - 54.0 fl    MPV 9.0 6.0 - 12.0 fL    Platelets 256 140 - 500 10*3/mm3    Neutrophil % 56.9 42.7 - 76.0 %    Lymphocyte % 30.9 19.6 - 45.3 %    Monocyte % 6.4 5.0 - 12.0 %    Eosinophil % 5.0 0.3 - 6.2 %    Basophil % 0.6 0.0 - 1.5 %    Immature Grans % 0.2 0.0 - 0.5 %    Neutrophils, Absolute 3.53 1.90 - 8.10 10*3/mm3    Lymphocytes, Absolute 1.92 0.90 - 4.80 10*3/mm3    Monocytes, Absolute 0.40 0.20 - 1.20 10*3/mm3    Eosinophils, Absolute 0.31 0.00 - 0.70 10*3/mm3    Basophils, Absolute 0.04 0.00 - 0.20 10*3/mm3    Immature Grans, Absolute 0.01 0.00 - 0.03 10*3/mm3    nRBC 0.0 0.0 - 0.0 /100 WBC   Urinalysis, Microscopic Only - Urine, Clean Catch   Result Value Ref Range    RBC, UA 3-5 (A) None  Seen, 0-2 /HPF    WBC, UA 0-2 None Seen, 0-2 /HPF    Bacteria, UA None Seen None Seen /HPF    Squamous Epithelial Cells, UA 0-2 None Seen, 0-2 /HPF    Hyaline Casts, UA 0-2 None Seen /LPF    Methodology Manual Light Microscopy            Shirin was seen today for follow-up and ulcerative colitis.    Diagnoses and all orders for this visit:    Chronic hypertension in pregnancy    Family history of breast cancer  -     Mammo Screening Bilateral With CAD; Future    Other migraine without status migrainosus, not intractable  -     SUMAtriptan (IMITREX) 100 MG tablet; Take one tablet at onset of headache. May repeat dose one time in 2 hours if headache not relieved.    Colitis  -     mesalamine (LIALDA) 1.2 g EC tablet; Take 4 tablets by mouth Daily for 360 days.    Prediabetes      Cont current meds.  Ov/labs in 3 mo.  Encouraged healthy diet and exercise to help control glucoses.     Outpatient Medications Prior to Visit   Medication Sig Dispense Refill   • labetalol (NORMODYNE) 300 MG tablet Take 300 mg by mouth 2 (Two) Times a Day.     • PRENATAL VIT-FE FUMARATE-FA PO Take 1 tablet by mouth.     • labetalol (NORMODYNE) 100 MG tablet TAKE 1 TABLET BY MOUTH 2 TIMES A DAY (TAKE WITH 200 MG TABLET FOR TOTAL 300 MG TWICE DAILY) 180 tablet 1   • labetalol (NORMODYNE) 200 MG tablet TAKE 1 TABLET BY MOUTH 2 TIMES A DAY (TAKE WITH 100 MG TABLET FOR TOTAL 300 MG TWICE DAILY) 180 tablet 0   • Multiple Vitamins-Minerals (MULTIVITAMIN PO) Take 1 tablet by mouth daily.     • Magnesium 300 MG capsule Take 300 mg by mouth.     • mesalamine (LIALDA) 1.2 g EC tablet Take 1.2 g by mouth 4 (Four) Times a Day.     • Probiotic Product (PROBIOTIC PO) Take 1 capsule by mouth daily.       No facility-administered medications prior to visit.      New Medications Ordered This Visit   Medications   • SUMAtriptan (IMITREX) 100 MG tablet     Sig: Take one tablet at onset of headache. May repeat dose one time in 2 hours if headache not relieved.      Dispense:  27 tablet     Refill:  1   • mesalamine (LIALDA) 1.2 g EC tablet     Sig: Take 4 tablets by mouth Daily for 360 days.     Dispense:  360 tablet     Refill:  3     [unfilled]  Medications Discontinued During This Encounter   Medication Reason   • labetalol (NORMODYNE) 200 MG tablet Dose adjustment   • labetalol (NORMODYNE) 100 MG tablet Dose adjustment   • Magnesium 300 MG capsule *Therapy completed   • mesalamine (LIALDA) 1.2 g EC tablet Dose adjustment   • Multiple Vitamins-Minerals (MULTIVITAMIN PO) *Therapy completed   • Probiotic Product (PROBIOTIC PO) *Therapy completed   • mesalamine (LIALDA) 1.2 g EC tablet Reorder         Return in about 3 months (around 1/12/2019) for Recheck, labs.

## 2018-10-13 ENCOUNTER — DOCUMENTATION (OUTPATIENT)
Dept: INTERNAL MEDICINE | Facility: CLINIC | Age: 33
End: 2018-10-13

## 2018-10-13 RX ORDER — NITROFURANTOIN 25; 75 MG/1; MG/1
100 CAPSULE ORAL EVERY 12 HOURS SCHEDULED
Qty: 14 CAPSULE | Refills: 0 | Status: SHIPPED | OUTPATIENT
Start: 2018-10-13 | End: 2018-10-20

## 2018-10-16 ENCOUNTER — TELEPHONE (OUTPATIENT)
Dept: MAMMOGRAPHY | Facility: HOSPITAL | Age: 33
End: 2018-10-16

## 2018-10-16 NOTE — TELEPHONE ENCOUNTER
patient wanted to know if she could get a screening mammogram while she is still breastfeeding. Per mammography protocol a women waits 6 months after breast feeding to have a screening mammogram.patient stated not problems and understood.

## 2018-10-19 DIAGNOSIS — K52.9 COLITIS: ICD-10-CM

## 2018-10-19 RX ORDER — MESALAMINE 1.2 G/1
TABLET, DELAYED RELEASE ORAL
Qty: 360 TABLET | Refills: 3 | Status: SHIPPED | OUTPATIENT
Start: 2018-10-19 | End: 2018-12-11 | Stop reason: SDUPTHER

## 2018-11-15 RX ORDER — LABETALOL 300 MG/1
300 TABLET, FILM COATED ORAL 2 TIMES DAILY
Qty: 180 TABLET | Refills: 0 | Status: SHIPPED | OUTPATIENT
Start: 2018-11-15 | End: 2019-01-22 | Stop reason: SDUPTHER

## 2018-12-11 DIAGNOSIS — K52.9 COLITIS: ICD-10-CM

## 2018-12-11 RX ORDER — MESALAMINE 1.2 G/1
TABLET, DELAYED RELEASE ORAL
Qty: 360 TABLET | Refills: 3 | Status: SHIPPED | OUTPATIENT
Start: 2018-12-11 | End: 2018-12-12 | Stop reason: SDUPTHER

## 2018-12-12 DIAGNOSIS — K52.9 COLITIS: ICD-10-CM

## 2018-12-13 RX ORDER — MESALAMINE 1.2 G/1
TABLET, DELAYED RELEASE ORAL
Qty: 360 TABLET | Refills: 3 | Status: SHIPPED | OUTPATIENT
Start: 2018-12-13 | End: 2018-12-27 | Stop reason: SDUPTHER

## 2018-12-27 RX ORDER — MESALAMINE 1.2 G/1
1200 TABLET, DELAYED RELEASE ORAL 4 TIMES DAILY
Qty: 360 TABLET | Refills: 6 | OUTPATIENT
Start: 2018-12-27 | End: 2022-01-12 | Stop reason: SDUPTHER

## 2018-12-27 RX ORDER — MESALAMINE 1.2 G/1
1200 TABLET, DELAYED RELEASE ORAL
Qty: 360 TABLET | Refills: 6 | Status: SHIPPED | OUTPATIENT
Start: 2018-12-27 | End: 2018-12-27 | Stop reason: SDUPTHER

## 2019-01-14 ENCOUNTER — TELEPHONE (OUTPATIENT)
Dept: INTERNAL MEDICINE | Facility: CLINIC | Age: 34
End: 2019-01-14

## 2019-01-14 NOTE — TELEPHONE ENCOUNTER
Shirin asked me to have Dr Shaw review her chart because she felt it should have been coded as a physical.  She was billed over 100.00.  After review by Dr Shaw she said that a full physical was not done and it states follow up in the chief complaint.  I explained that to the patient.  She had no further questions

## 2019-01-22 RX ORDER — LABETALOL 300 MG/1
TABLET, FILM COATED ORAL
Qty: 180 TABLET | Refills: 0 | Status: SHIPPED | OUTPATIENT
Start: 2019-01-22 | End: 2019-03-25 | Stop reason: SDUPTHER

## 2019-03-05 DIAGNOSIS — Z00.00 HEALTHCARE MAINTENANCE: Primary | ICD-10-CM

## 2019-03-10 ENCOUNTER — RESULTS ENCOUNTER (OUTPATIENT)
Dept: INTERNAL MEDICINE | Facility: CLINIC | Age: 34
End: 2019-03-10

## 2019-03-10 DIAGNOSIS — Z00.00 HEALTHCARE MAINTENANCE: ICD-10-CM

## 2019-03-26 RX ORDER — LABETALOL 300 MG/1
TABLET, FILM COATED ORAL
Qty: 180 TABLET | Refills: 1 | Status: SHIPPED | OUTPATIENT
Start: 2019-03-26 | End: 2019-10-17

## 2019-07-18 ENCOUNTER — OFFICE VISIT (OUTPATIENT)
Dept: SURGERY | Facility: CLINIC | Age: 34
End: 2019-07-18

## 2019-07-18 ENCOUNTER — OFFICE VISIT (OUTPATIENT)
Dept: FAMILY MEDICINE CLINIC | Facility: CLINIC | Age: 34
End: 2019-07-18

## 2019-07-18 VITALS
DIASTOLIC BLOOD PRESSURE: 77 MMHG | WEIGHT: 243 LBS | BODY MASS INDEX: 38.14 KG/M2 | HEART RATE: 89 BPM | SYSTOLIC BLOOD PRESSURE: 110 MMHG | HEIGHT: 67 IN | OXYGEN SATURATION: 98 %

## 2019-07-18 VITALS — OXYGEN SATURATION: 98 % | HEIGHT: 67 IN | BODY MASS INDEX: 36.57 KG/M2 | WEIGHT: 233 LBS | HEART RATE: 86 BPM

## 2019-07-18 DIAGNOSIS — R42 ORTHOSTATIC DIZZINESS: ICD-10-CM

## 2019-07-18 DIAGNOSIS — G43.829 MENSTRUAL MIGRAINE WITHOUT STATUS MIGRAINOSUS, NOT INTRACTABLE: ICD-10-CM

## 2019-07-18 DIAGNOSIS — I10 ESSENTIAL HYPERTENSION: ICD-10-CM

## 2019-07-18 DIAGNOSIS — R10.11 RIGHT UPPER QUADRANT ABDOMINAL PAIN: Primary | ICD-10-CM

## 2019-07-18 DIAGNOSIS — K51.90 ULCERATIVE COLITIS WITHOUT COMPLICATIONS, UNSPECIFIED LOCATION (HCC): Primary | ICD-10-CM

## 2019-07-18 PROCEDURE — 93000 ELECTROCARDIOGRAM COMPLETE: CPT | Performed by: NURSE PRACTITIONER

## 2019-07-18 PROCEDURE — 99203 OFFICE O/P NEW LOW 30 MIN: CPT | Performed by: NURSE PRACTITIONER

## 2019-07-18 PROCEDURE — 99212 OFFICE O/P EST SF 10 MIN: CPT | Performed by: SURGERY

## 2019-07-18 RX ORDER — DOCUSATE SODIUM 100 MG/1
100 CAPSULE, LIQUID FILLED ORAL 3 TIMES DAILY
COMMUNITY
End: 2021-01-27

## 2019-07-18 RX ORDER — SUMATRIPTAN 100 MG/1
TABLET, FILM COATED ORAL
Qty: 12 TABLET | Refills: 3
Start: 2019-07-18 | End: 2022-03-10 | Stop reason: SDUPTHER

## 2019-07-18 NOTE — PROGRESS NOTES
CC: Abdominal pain    HPI: 33-year-old lady indicates that she was straining during a bowel movement 3 weeks ago and felt sudden onset of pain in the right midabdomen.  The pain has since resolved without any specific intervention.    PE:    Awake, alert  Abdomen: Soft, nondistended, nontender, no visible or palpable hernia.  The site of tenderness when it occurred is to the right of the umbilicus at the lateral margin of the rectus.  No findings of note are present there.    IMPRESSION & PLAN:  33-year-old lady with self-limited episode of right-sided abdominal pain with no findings of concern on exam and spontaneous resolution of symptoms.  No further work-up warranted unless symptoms recur.

## 2019-07-18 NOTE — PROGRESS NOTES
Subjective   Shirin Hernandes is a 33 y.o. female.     Pleasant patient needs new PCP  She is requesting referral for ulcerative colitis  Presently stable    She is had some episodes of dizziness  Over the last several weeks generally when she gets out of bed in the morning when she first stands and goes to the bathroom feels lightheaded  Several times working out different machine positions from going low position to standing with lightheadedness  At least one occasion she had a few palpitations while lifting weights but no chest pain  She has no racing heart no recurrent palpitations no syncope episodes she has no fever no chills no vision loss slurred speech  She has no hearing difficulty ear pain or vertigo when turning her head    She does have a history of hypertension with pregnancy and now taking labetalol as she still breast-feeding her infant is 2 years old she probably start decreasing the breast-feeding over the next few months.    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 she works    Home health with blood pressure  She is gotten several readings 160  Other places however has been much lower such as today we have 110    Social history   2-year-old 4-year-old   home health nurse as well          Ulcerative collitis  Needs new gi  Dr Rusty Millard [employer]  Weight training  Breast feeding  Son  francisca      Sh  RN [employer]    2 and 4 year     gino  Nurse         The following portions of the patient's history were reviewed and updated as appropriate: allergies, current medications, past family history, past medical history, past social history, past surgical history and problem list.    Review of Systems    Constitutional: Negative for chills, diaphoresis, fatigue, fever and unexpected weight loss.   HENT: Negative.  Negative for trouble swallowing.    Eyes: Negative.    Respiratory: Negative for cough and shortness of breath.    Cardiovascular: Positive for palpitations. Negative for chest pain and leg swelling.        Orthostatic dizziness   Gastrointestinal: Negative for abdominal pain and blood in stool.   Genitourinary: Negative.  Negative for pelvic pain.   Musculoskeletal: Negative.    Skin: Negative.    Neurological: Negative.  Negative for dizziness, speech difficulty, weakness and confusion.   Psychiatric/Behavioral: Negative.    All other systems reviewed and are negative.      Objective   Physical Exam   Constitutional: She is oriented to person, place, and time. She appears well-developed and well-nourished.   HENT:   Head: Normocephalic.   Mouth/Throat: Oropharynx is clear and moist. No oropharyngeal exudate.   Eyes: Conjunctivae are normal. Pupils are equal, round, and reactive to light.   Neck: Neck supple. No JVD present.   Cardiovascular: Normal rate, regular rhythm and normal heart sounds. Exam reveals no friction rub.   No murmur heard.  Pulmonary/Chest: Effort normal and breath sounds normal. No respiratory distress. She has no wheezes.   Abdominal: Soft. Bowel sounds are normal. She exhibits no distension and no mass. There is no tenderness. There is no guarding. No hernia.   Musculoskeletal: She exhibits no edema or tenderness.   Lymphadenopathy:     She has no cervical adenopathy.   Neurological: She is alert and oriented to person, place, and time. No cranial nerve deficit.   Skin: Skin is warm and dry.   Psychiatric: She has a normal mood and affect. Her behavior is normal. Judgment and thought content normal.   Vitals reviewed.        Assessment/Plan   Shirin was seen today for annual exam.    Diagnoses and all orders for this visit:    Ulcerative colitis without complications, unspecified  location (CMS/[facility])  -     Ambulatory Referral to Gastroenterology    Essential hypertension  -     ECG 12 Lead    Orthostatic dizziness  -     ECG 12 Lead    Menstrual migraine without status migrainosus, not intractable    Other orders  -     SUMAtriptan (IMITREX) 100 MG tablet; Take one tablet at onset of headache. May repeat dose one time in 2 hours if headache not relieved.      Requested new GI doctor referral placed  Healthy diet regular exercise 1400 silvestre a day mostly vegetables  Chicken fish decrease breads pasta  Close follow-up with blood pressures  Home monitor to be verified for accuracy            Patient Instructions   Discharge instructions    Decrease labetalol 300 mg 1/2 tablet twice daily  Call me or email email  1 week with at least 2 days of reading and heart rate  Push fluids  Chest pain shortness of breath syncope weakness slurred speech confusion emergency room    Follow-up in 3 months otherwise    Thank You,      James Epley, NP

## 2019-07-18 NOTE — PROGRESS NOTES
Procedure   Procedures     Adult ECG Report     Name: Shirin Hernandes   Age: 33 y.o.   Gender: female       Rate: 75   Rhythm: normal sinus rhythm   QRS Axis: nml   NM Interval: 176   QRS Duration: 92   QTc: 433   Voltages: .   Conduction Disturbances: none   Other Abnormalities: none     Narrative Interpretation: Normal sinus rhythm no ST abnormalities normal EKG indication orthostatic dizziness no prior each EKG available for comparison

## 2019-07-18 NOTE — PATIENT INSTRUCTIONS
Discharge instructions    Decrease labetalol 300 mg 1/2 tablet twice daily  Call me or email email  1 week with at least 2 days of reading and heart rate  Push fluids  Chest pain shortness of breath syncope weakness slurred speech confusion emergency room    Follow-up in 3 months otherwise    Thank You,      James Epley,  NP

## 2019-09-17 DIAGNOSIS — Z00.00 HEALTH MAINTENANCE EXAMINATION: ICD-10-CM

## 2019-09-17 DIAGNOSIS — I10 ESSENTIAL HYPERTENSION: Primary | ICD-10-CM

## 2019-09-17 DIAGNOSIS — K51.90 ULCERATIVE COLITIS WITHOUT COMPLICATIONS, UNSPECIFIED LOCATION (HCC): ICD-10-CM

## 2019-09-18 DIAGNOSIS — R00.2 PALPITATIONS: Primary | ICD-10-CM

## 2019-09-20 ENCOUNTER — RESULTS ENCOUNTER (OUTPATIENT)
Dept: FAMILY MEDICINE CLINIC | Facility: CLINIC | Age: 34
End: 2019-09-20

## 2019-09-20 DIAGNOSIS — K51.90 ULCERATIVE COLITIS WITHOUT COMPLICATIONS, UNSPECIFIED LOCATION (HCC): ICD-10-CM

## 2019-09-20 DIAGNOSIS — I10 ESSENTIAL HYPERTENSION: ICD-10-CM

## 2019-09-20 DIAGNOSIS — Z00.00 HEALTH MAINTENANCE EXAMINATION: ICD-10-CM

## 2019-10-11 LAB
ALBUMIN SERPL-MCNC: 4.2 G/DL (ref 3.5–5.2)
ALBUMIN/GLOB SERPL: 1.4 G/DL
ALP SERPL-CCNC: 96 U/L (ref 39–117)
ALT SERPL-CCNC: 26 U/L (ref 1–33)
APPEARANCE UR: CLEAR
AST SERPL-CCNC: 16 U/L (ref 1–32)
BASOPHILS # BLD AUTO: 0.07 10*3/MM3 (ref 0–0.2)
BASOPHILS NFR BLD AUTO: 1 % (ref 0–1.5)
BILIRUB SERPL-MCNC: 0.3 MG/DL (ref 0.2–1.2)
BILIRUB UR QL STRIP: NEGATIVE
BUN SERPL-MCNC: 20 MG/DL (ref 6–20)
BUN/CREAT SERPL: 21.1 (ref 7–25)
CALCIUM SERPL-MCNC: 9.1 MG/DL (ref 8.6–10.5)
CHLORIDE SERPL-SCNC: 102 MMOL/L (ref 98–107)
CHOLEST SERPL-MCNC: 194 MG/DL (ref 0–200)
CO2 SERPL-SCNC: 26.6 MMOL/L (ref 22–29)
COLOR UR: YELLOW
CREAT SERPL-MCNC: 0.95 MG/DL (ref 0.57–1)
EOSINOPHIL # BLD AUTO: 0.38 10*3/MM3 (ref 0–0.4)
EOSINOPHIL NFR BLD AUTO: 5.2 % (ref 0.3–6.2)
ERYTHROCYTE [DISTWIDTH] IN BLOOD BY AUTOMATED COUNT: 13.2 % (ref 12.3–15.4)
GLOBULIN SER CALC-MCNC: 2.9 GM/DL
GLUCOSE SERPL-MCNC: 92 MG/DL (ref 65–99)
GLUCOSE UR QL: NEGATIVE
HBA1C MFR BLD: 5.3 % (ref 4.8–5.6)
HCT VFR BLD AUTO: 45.4 % (ref 34–46.6)
HDLC SERPL-MCNC: 48 MG/DL (ref 40–60)
HGB BLD-MCNC: 14.8 G/DL (ref 12–15.9)
HGB UR QL STRIP: NEGATIVE
IMM GRANULOCYTES # BLD AUTO: 0.02 10*3/MM3 (ref 0–0.05)
IMM GRANULOCYTES NFR BLD AUTO: 0.3 % (ref 0–0.5)
KETONES UR QL STRIP: NEGATIVE
LDLC SERPL CALC-MCNC: 110 MG/DL (ref 0–100)
LDLC/HDLC SERPL: 2.28 {RATIO}
LEUKOCYTE ESTERASE UR QL STRIP: NEGATIVE
LYMPHOCYTES # BLD AUTO: 2.45 10*3/MM3 (ref 0.7–3.1)
LYMPHOCYTES NFR BLD AUTO: 33.6 % (ref 19.6–45.3)
MCH RBC QN AUTO: 28.6 PG (ref 26.6–33)
MCHC RBC AUTO-ENTMCNC: 32.6 G/DL (ref 31.5–35.7)
MCV RBC AUTO: 87.8 FL (ref 79–97)
MONOCYTES # BLD AUTO: 0.41 10*3/MM3 (ref 0.1–0.9)
MONOCYTES NFR BLD AUTO: 5.6 % (ref 5–12)
NEUTROPHILS # BLD AUTO: 3.96 10*3/MM3 (ref 1.7–7)
NEUTROPHILS NFR BLD AUTO: 54.3 % (ref 42.7–76)
NITRITE UR QL STRIP: NEGATIVE
NRBC BLD AUTO-RTO: 0 /100 WBC (ref 0–0.2)
PH UR STRIP: 6.5 [PH] (ref 5–8)
PLATELET # BLD AUTO: 310 10*3/MM3 (ref 140–450)
POTASSIUM SERPL-SCNC: 4.7 MMOL/L (ref 3.5–5.2)
PROT SERPL-MCNC: 7.1 G/DL (ref 6–8.5)
PROT UR QL STRIP: NEGATIVE
RBC # BLD AUTO: 5.17 10*6/MM3 (ref 3.77–5.28)
SODIUM SERPL-SCNC: 141 MMOL/L (ref 136–145)
SP GR UR: 1.02 (ref 1–1.03)
TRIGL SERPL-MCNC: 182 MG/DL (ref 0–150)
TSH SERPL DL<=0.005 MIU/L-ACNC: 2.12 UIU/ML (ref 0.27–4.2)
UROBILINOGEN UR STRIP-MCNC: NORMAL MG/DL
VLDLC SERPL CALC-MCNC: 36.4 MG/DL
WBC # BLD AUTO: 7.29 10*3/MM3 (ref 3.4–10.8)

## 2019-10-17 ENCOUNTER — OFFICE VISIT (OUTPATIENT)
Dept: FAMILY MEDICINE CLINIC | Facility: CLINIC | Age: 34
End: 2019-10-17

## 2019-10-17 VITALS
SYSTOLIC BLOOD PRESSURE: 134 MMHG | BODY MASS INDEX: 39.24 KG/M2 | HEART RATE: 78 BPM | DIASTOLIC BLOOD PRESSURE: 94 MMHG | WEIGHT: 250 LBS | OXYGEN SATURATION: 98 % | HEIGHT: 67 IN

## 2019-10-17 DIAGNOSIS — I10 ESSENTIAL HYPERTENSION: Primary | ICD-10-CM

## 2019-10-17 DIAGNOSIS — Z23 NEED FOR IMMUNIZATION AGAINST INFLUENZA: ICD-10-CM

## 2019-10-17 PROCEDURE — 90674 CCIIV4 VAC NO PRSV 0.5 ML IM: CPT | Performed by: NURSE PRACTITIONER

## 2019-10-17 PROCEDURE — 99213 OFFICE O/P EST LOW 20 MIN: CPT | Performed by: NURSE PRACTITIONER

## 2019-10-17 PROCEDURE — 90471 IMMUNIZATION ADMIN: CPT | Performed by: NURSE PRACTITIONER

## 2019-10-17 NOTE — PATIENT INSTRUCTIONS
Discharge instructions    1200 to 1400 silvestre a day mostly vegetables chicken fish increase water 64 ounces a day decrease breads and pasta  Focus on bread and pasta as well  High sodium content  Continue exercise  Check blood pressure weekly  Omron good blood pressure machine but have checked professionally    Recheck in 3 months  If blood pressures increasing please call return office

## 2019-10-27 NOTE — PROGRESS NOTES
Subjective   Shirin Hernandes is a 34 y.o. female.     Follow-up essential hypertension  Blood pressure today 134/94 no chest pain shortness of breath dizziness  Previously taking labetalol blood pressure medicine given during pregnancy  Does not monitor blood pressure at home  Would like dietary management  She has a 2-year-old and 4-year-old and has been taking blood pressure medicine pregnancy and after delivery  More weight problems after her second child         The following portions of the patient's history were reviewed and updated as appropriate: allergies, current medications, past family history, past medical history, past social history, past surgical history and problem list.    Review of Systems   Constitutional: Negative for chills, fatigue, fever and unexpected weight loss.   HENT: Negative.  Negative for trouble swallowing.    Eyes: Negative.    Respiratory: Negative for cough and shortness of breath.    Cardiovascular: Negative for chest pain, palpitations and leg swelling.   Gastrointestinal: Negative for abdominal pain and blood in stool.   Genitourinary: Negative.  Negative for pelvic pain.   Musculoskeletal: Negative.    Skin: Negative.    Neurological: Negative.  Negative for dizziness, speech difficulty, weakness and confusion.   Psychiatric/Behavioral: Negative.        Objective   Physical Exam   Constitutional: She is oriented to person, place, and time. She appears well-developed and well-nourished.   HENT:   Head: Normocephalic.   Mouth/Throat: Oropharynx is clear and moist. No oropharyngeal exudate.   Eyes: Conjunctivae are normal. Pupils are equal, round, and reactive to light.   Neck: Neck supple. No JVD present.   Cardiovascular: Normal rate, regular rhythm and normal heart sounds. Exam reveals no friction rub.   No murmur heard.  Pulmonary/Chest: Effort normal and breath sounds normal. No respiratory distress. She has no wheezes.   Abdominal: Soft. Bowel sounds are normal. She  exhibits no distension and no mass. There is no tenderness. There is no guarding. No hernia.   Musculoskeletal: She exhibits no edema or tenderness.   Lymphadenopathy:     She has no cervical adenopathy.   Neurological: She is alert and oriented to person, place, and time.   Skin: Skin is warm and dry.   Psychiatric: She has a normal mood and affect. Her behavior is normal. Judgment and thought content normal.   Vitals reviewed.        Assessment/Plan   Shirin was seen today for follow-up htn.    Diagnoses and all orders for this visit:    Essential hypertension    Need for immunization against influenza  -     Flucelvax Quad=>4Years (2700-1873)      Essential hypertension obesity  Therapeutic lifestyle changes healthy diet follow instructions below  Work-up gradually avoid intense workouts until blood pressures controlled better            Patient Instructions   Discharge instructions    1200 to 1400 silvestre a day mostly vegetables chicken fish increase water 64 ounces a day decrease breads and pasta  Focus on bread and pasta as well  High sodium content  Continue exercise  Check blood pressure weekly  Omron good blood pressure machine but have checked professionally    Recheck in 3 months  If blood pressures increasing please call return office

## 2019-12-05 LAB
EXTERNAL ANTIBODY SCREEN: NEGATIVE
EXTERNAL HEPATITIS B SURFACE ANTIGEN: NEGATIVE
EXTERNAL RUBELLA QUALITATIVE: NORMAL
EXTERNAL SYPHILIS RPR SCREEN: NORMAL

## 2019-12-18 ENCOUNTER — OFFICE VISIT (OUTPATIENT)
Dept: GASTROENTEROLOGY | Facility: CLINIC | Age: 34
End: 2019-12-18

## 2019-12-18 VITALS
WEIGHT: 250 LBS | SYSTOLIC BLOOD PRESSURE: 122 MMHG | BODY MASS INDEX: 39.24 KG/M2 | DIASTOLIC BLOOD PRESSURE: 84 MMHG | HEIGHT: 67 IN | TEMPERATURE: 99.3 F

## 2019-12-18 DIAGNOSIS — Z3A.11 11 WEEKS GESTATION OF PREGNANCY: ICD-10-CM

## 2019-12-18 DIAGNOSIS — K51.90 ULCERATIVE COLITIS WITHOUT COMPLICATIONS, UNSPECIFIED LOCATION (HCC): Primary | ICD-10-CM

## 2019-12-18 PROCEDURE — 99204 OFFICE O/P NEW MOD 45 MIN: CPT | Performed by: INTERNAL MEDICINE

## 2019-12-18 RX ORDER — LABETALOL 100 MG/1
TABLET, FILM COATED ORAL
COMMUNITY
Start: 2019-11-20 | End: 2021-01-27

## 2019-12-18 RX ORDER — PRENATAL VIT NO.126/IRON/FOLIC 28MG-0.8MG
TABLET ORAL DAILY
COMMUNITY
End: 2021-04-27

## 2019-12-18 NOTE — PROGRESS NOTES
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 She had her only colonoscopy by Dr. Wilks in  when she was diagnosed with UC. On Lialda 2/day and is doing well. No rectal bleeding or melena. No diarrhea, +consitpation. She is 11 weeks pregnant now. . Two kids 4 yr and 2 yrs. OB-Dr. Valencia. She has tried generic for Lialda but she still has abdominal pain with the generic. No extraintestinal manifestations of UC.    Past Medical History:   Diagnosis Date   • Anxiety    • GERD (gastroesophageal reflux disease)    • Headache    • Hx of migraines    • Hypertension    • NSAID sensitivity     Cannot take NSAIDs while taking LIALDA   • Peptic ulceration    • Ulcerative colitis (CMS/HCC)        Past Surgical History:   Procedure Laterality Date   •  SECTION  2015   •  SECTION N/A 2017    Procedure:  SECTION REPEAT;  Surgeon: Aron Valencia MD;  Location: [facility] LABOR DELIVERY;  Service:    • CHOLECYSTECTOMY WITH INTRAOPERATIVE CHOLANGIOGRAM N/A 2016    Procedure: CHOLECYSTECTOMY LAPAROSCOPIC WITH INTRAOPERATIVE CHOLANGIOGRAM AND LIVER BIOPSY ;  Surgeon: Dieudonne Lamb MD;  Location: [facility] OR [facility];  Service:    • COLONOSCOPY W/ BIOPSIES N/A 2016    Dr. Rom Wilks - Ulcerative Colitis per pt    • D&C CERVICAL BIOPSY  2014         Current Outpatient Medications:   •  docusate sodium (COLACE) 100 MG capsule, Take 100 mg by mouth 3 (Three) Times a Day., Disp: , Rfl:   •  labetalol (NORMODYNE) 100 MG tablet, , Disp: , Rfl:   •  LIALDA 1.2 g EC tablet, Take 1 tablet by mouth 4 (Four) Times a Day. (Patient taking differently: Take 2,400 mg by mouth Every Night.), Disp: 360 tablet, Rfl: 6  •  omeprazole (priLOSEC) 20 MG capsule, Take 20 mg by mouth Daily., Disp: , Rfl:   •  Prenatal Vit-Fe Fumarate-FA (PRENATAL, CLASSIC,  VITAMIN) 28-0.8 MG tablet tablet, Take  by mouth Daily., Disp: , Rfl:   •  progesterone (PROMETRIUM) 200 MG capsule, , Disp: , Rfl:   •  SUMAtriptan (IMITREX) 100 MG tablet, Take one tablet at onset of headache. May repeat dose one time in 2 hours if headache not relieved., Disp: 12 tablet, Rfl: 3    Allergies   Allergen Reactions   • Amoxicillin Other (See Comments)     As a Child   • Benzoyl Peroxide Hives       Family History   Problem Relation Age of Onset   • Myocarditis Mother    • Hypertension Father    • Diabetes Maternal Uncle    • Aneurysm Paternal Aunt    • Pancreatic cancer Paternal Aunt    • Diabetes Maternal Grandmother    • Diabetes Maternal Grandfather    • Alcohol abuse Paternal Grandfather    • Diabetes Maternal Uncle        Social History     Socioeconomic History   • Marital status:      Spouse name: Not on file   • Number of children: Not on file   • Years of education: Not on file   • Highest education level: Not on file   Tobacco Use   • Smoking status: Former Smoker   • Smokeless tobacco: Never Used   • Tobacco comment: smoked as a teen    Substance and Sexual Activity   • Alcohol use: No   • Drug use: No   • Sexual activity: Yes     Partners: Male       Review of Systems   Gastrointestinal: Negative for abdominal distention and abdominal pain.   All other systems reviewed and are negative.      Vitals:    12/18/19 0954   BP: 122/84   Temp: 99.3 °F (37.4 °C)       Physical Exam   Constitutional: She is oriented to person, place, and time. She appears well-developed and well-nourished. No distress.   HENT:   Head: Normocephalic and atraumatic. Hair is normal.   Right Ear: Hearing, tympanic membrane, external ear and ear canal normal. No drainage. No decreased hearing is noted.   Left Ear: Hearing, tympanic membrane, external ear and ear canal normal. No decreased hearing is noted.   Nose: No nasal deformity.   Mouth/Throat: Oropharynx is clear and moist.   Eyes: Pupils are equal, round,  and reactive to light. Conjunctivae, EOM and lids are normal. Right eye exhibits no discharge. Left eye exhibits no discharge.   Neck: Normal range of motion. Neck supple. No JVD present. No tracheal deviation present. No thyromegaly present.   Cardiovascular: Normal rate, regular rhythm, normal heart sounds, intact distal pulses and normal pulses. Exam reveals no gallop and no friction rub.   No murmur heard.  Pulmonary/Chest: Effort normal and breath sounds normal. No respiratory distress. She has no wheezes. She has no rales. She exhibits no tenderness.   Abdominal: Soft. Bowel sounds are normal. She exhibits no distension and no mass. There is no tenderness. There is no rebound and no guarding. No hernia.   Musculoskeletal: Normal range of motion. She exhibits no edema, tenderness or deformity.   Lymphadenopathy:     She has no cervical adenopathy.   Neurological: She is alert and oriented to person, place, and time. She has normal reflexes. She displays normal reflexes. No cranial nerve deficit. She exhibits normal muscle tone. Coordination normal.   Skin: Skin is warm and dry. No rash noted. She is not diaphoretic. No erythema.   Psychiatric: She has a normal mood and affect. Her behavior is normal. Judgment and thought content normal.   Vitals reviewed.      Shirin was seen today for ulcerative colitis and constipation.    Diagnoses and all orders for this visit:    Ulcerative colitis without complications, unspecified location (CMS/[facility])    11 weeks gestation of pregnancy      Assessment:  1. 11 weeks pregnant  2) h/o ulcerative colitis diagnosed in 2016.  3.     Recommendations:  1. Continue Lialda 2/day.   2. F/u 6+ mos. We could discuss a colonoscopy then.     Return in about 6 months (around 6/18/2020).    Solitario Acuna MD  12/18/2019

## 2020-07-06 ENCOUNTER — HOSPITAL ENCOUNTER (INPATIENT)
Facility: HOSPITAL | Age: 35
LOS: 2 days | Discharge: HOME | End: 2020-07-08
Attending: OBSTETRICS & GYNECOLOGY | Admitting: OBSTETRICS & GYNECOLOGY

## 2020-07-06 ENCOUNTER — ANESTHESIA EVENT (OUTPATIENT)
Dept: LABOR AND DELIVERY | Facility: HOSPITAL | Age: 35
End: 2020-07-06

## 2020-07-06 ENCOUNTER — ANESTHESIA (OUTPATIENT)
Dept: LABOR AND DELIVERY | Facility: HOSPITAL | Age: 35
End: 2020-07-06

## 2020-07-06 DIAGNOSIS — Z30.2 REQUEST FOR STERILIZATION: ICD-10-CM

## 2020-07-06 PROBLEM — Z34.90 PREGNANCY: Status: ACTIVE | Noted: 2020-07-06

## 2020-07-06 LAB
ABO GROUP BLD: NORMAL
ALBUMIN SERPL-MCNC: 3.5 G/DL (ref 3.5–5.2)
ALBUMIN/GLOB SERPL: 1.2 G/DL
ALP SERPL-CCNC: 98 U/L (ref 39–117)
ALT SERPL W P-5'-P-CCNC: 16 U/L (ref 1–33)
ANION GAP SERPL CALCULATED.3IONS-SCNC: 12.4 MMOL/L (ref 5–15)
AST SERPL-CCNC: 18 U/L (ref 1–32)
BASOPHILS # BLD AUTO: 0.04 10*3/MM3 (ref 0–0.2)
BASOPHILS NFR BLD AUTO: 0.4 % (ref 0–1.5)
BILIRUB SERPL-MCNC: 0.2 MG/DL (ref 0.2–1.2)
BLD GP AB SCN SERPL QL: NEGATIVE
BUN SERPL-MCNC: 14 MG/DL (ref 6–20)
BUN/CREAT SERPL: 16.9 (ref 7–25)
CALCIUM SPEC-SCNC: 9 MG/DL (ref 8.6–10.5)
CHLORIDE SERPL-SCNC: 103 MMOL/L (ref 98–107)
CO2 SERPL-SCNC: 19.6 MMOL/L (ref 22–29)
CREAT SERPL-MCNC: 0.83 MG/DL (ref 0.57–1)
DEPRECATED RDW RBC AUTO: 43.8 FL (ref 37–54)
EOSINOPHIL # BLD AUTO: 0.22 10*3/MM3 (ref 0–0.4)
EOSINOPHIL NFR BLD AUTO: 2.1 % (ref 0.3–6.2)
ERYTHROCYTE [DISTWIDTH] IN BLOOD BY AUTOMATED COUNT: 14.3 % (ref 12.3–15.4)
GFR SERPL CREATININE-BSD FRML MDRD: 78 ML/MIN/1.73
GLOBULIN UR ELPH-MCNC: 3 GM/DL
GLUCOSE SERPL-MCNC: 86 MG/DL (ref 65–99)
HCT VFR BLD AUTO: 34.8 % (ref 34–46.6)
HGB BLD-MCNC: 11.6 G/DL (ref 12–15.9)
IMM GRANULOCYTES # BLD AUTO: 0.12 10*3/MM3 (ref 0–0.05)
IMM GRANULOCYTES NFR BLD AUTO: 1.2 % (ref 0–0.5)
LYMPHOCYTES # BLD AUTO: 2.18 10*3/MM3 (ref 0.7–3.1)
LYMPHOCYTES NFR BLD AUTO: 21.2 % (ref 19.6–45.3)
MCH RBC QN AUTO: 27.9 PG (ref 26.6–33)
MCHC RBC AUTO-ENTMCNC: 33.3 G/DL (ref 31.5–35.7)
MCV RBC AUTO: 83.7 FL (ref 79–97)
MONOCYTES # BLD AUTO: 0.62 10*3/MM3 (ref 0.1–0.9)
MONOCYTES NFR BLD AUTO: 6 % (ref 5–12)
NEUTROPHILS NFR BLD AUTO: 69.1 % (ref 42.7–76)
NEUTROPHILS NFR BLD AUTO: 7.08 10*3/MM3 (ref 1.7–7)
NRBC BLD AUTO-RTO: 0 /100 WBC (ref 0–0.2)
PLATELET # BLD AUTO: 209 10*3/MM3 (ref 140–450)
PMV BLD AUTO: 9.1 FL (ref 6–12)
POTASSIUM SERPL-SCNC: 3.9 MMOL/L (ref 3.5–5.2)
PROT SERPL-MCNC: 6.5 G/DL (ref 6–8.5)
RBC # BLD AUTO: 4.16 10*6/MM3 (ref 3.77–5.28)
RH BLD: POSITIVE
SARS-COV-2 RDRP RESP QL NAA+PROBE: NOT DETECTED
SODIUM SERPL-SCNC: 135 MMOL/L (ref 136–145)
T&S EXPIRATION DATE: NORMAL
WBC # BLD AUTO: 10.26 10*3/MM3 (ref 3.4–10.8)

## 2020-07-06 PROCEDURE — 86850 RBC ANTIBODY SCREEN: CPT | Performed by: OBSTETRICS & GYNECOLOGY

## 2020-07-06 PROCEDURE — C9803 HOPD COVID-19 SPEC COLLECT: HCPCS | Performed by: OBSTETRICS & GYNECOLOGY

## 2020-07-06 PROCEDURE — 25010000002 FENTANYL CITRATE (PF) 100 MCG/2ML SOLUTION: Performed by: ANESTHESIOLOGY

## 2020-07-06 PROCEDURE — 86901 BLOOD TYPING SEROLOGIC RH(D): CPT | Performed by: OBSTETRICS & GYNECOLOGY

## 2020-07-06 PROCEDURE — 25010000002 CEFAZOLIN PER 500 MG: Performed by: OBSTETRICS & GYNECOLOGY

## 2020-07-06 PROCEDURE — 25010000002 ONDANSETRON PER 1 MG: Performed by: ANESTHESIOLOGY

## 2020-07-06 PROCEDURE — 88302 TISSUE EXAM BY PATHOLOGIST: CPT | Performed by: OBSTETRICS & GYNECOLOGY

## 2020-07-06 PROCEDURE — 80053 COMPREHEN METABOLIC PANEL: CPT | Performed by: OBSTETRICS & GYNECOLOGY

## 2020-07-06 PROCEDURE — 86900 BLOOD TYPING SEROLOGIC ABO: CPT | Performed by: OBSTETRICS & GYNECOLOGY

## 2020-07-06 PROCEDURE — 25010000002 PHENYLEPHRINE PER 1 ML: Performed by: ANESTHESIOLOGY

## 2020-07-06 PROCEDURE — 85025 COMPLETE CBC W/AUTO DIFF WBC: CPT | Performed by: OBSTETRICS & GYNECOLOGY

## 2020-07-06 PROCEDURE — 87635 SARS-COV-2 COVID-19 AMP PRB: CPT | Performed by: OBSTETRICS & GYNECOLOGY

## 2020-07-06 PROCEDURE — 25010000002 MORPHINE PER 10 MG: Performed by: ANESTHESIOLOGY

## 2020-07-06 RX ORDER — PHYTONADIONE 1 MG/.5ML
INJECTION, EMULSION INTRAMUSCULAR; INTRAVENOUS; SUBCUTANEOUS
Status: DISPENSED
Start: 2020-07-06 | End: 2020-07-06

## 2020-07-06 RX ORDER — ONDANSETRON 2 MG/ML
4 INJECTION INTRAMUSCULAR; INTRAVENOUS ONCE AS NEEDED
Status: COMPLETED | OUTPATIENT
Start: 2020-07-06 | End: 2020-07-06

## 2020-07-06 RX ORDER — OXYTOCIN-SODIUM CHLORIDE 0.9% IV SOLN 30 UNIT/500ML 30-0.9/5 UT/ML-%
125 SOLUTION INTRAVENOUS CONTINUOUS PRN
Status: COMPLETED | OUTPATIENT
Start: 2020-07-06 | End: 2020-07-06

## 2020-07-06 RX ORDER — HYDROMORPHONE HYDROCHLORIDE 1 MG/ML
0.5 INJECTION, SOLUTION INTRAMUSCULAR; INTRAVENOUS; SUBCUTANEOUS
Status: ACTIVE | OUTPATIENT
Start: 2020-07-06 | End: 2020-07-06

## 2020-07-06 RX ORDER — EPHEDRINE SULFATE 50 MG/ML
INJECTION, SOLUTION INTRAVENOUS AS NEEDED
Status: DISCONTINUED | OUTPATIENT
Start: 2020-07-06 | End: 2020-07-06 | Stop reason: SURG

## 2020-07-06 RX ORDER — MORPHINE SULFATE 1 MG/ML
INJECTION, SOLUTION EPIDURAL; INTRATHECAL; INTRAVENOUS
Status: COMPLETED | OUTPATIENT
Start: 2020-07-06 | End: 2020-07-06

## 2020-07-06 RX ORDER — SODIUM CHLORIDE 0.9 % (FLUSH) 0.9 %
3 SYRINGE (ML) INJECTION EVERY 12 HOURS SCHEDULED
Status: DISCONTINUED | OUTPATIENT
Start: 2020-07-06 | End: 2020-07-06 | Stop reason: HOSPADM

## 2020-07-06 RX ORDER — OXYTOCIN-SODIUM CHLORIDE 0.9% IV SOLN 30 UNIT/500ML 30-0.9/5 UT/ML-%
250 SOLUTION INTRAVENOUS CONTINUOUS
Status: DISPENSED | OUTPATIENT
Start: 2020-07-06 | End: 2020-07-06

## 2020-07-06 RX ORDER — OXYCODONE AND ACETAMINOPHEN 10; 325 MG/1; MG/1
1 TABLET ORAL EVERY 4 HOURS PRN
Status: DISCONTINUED | OUTPATIENT
Start: 2020-07-06 | End: 2020-07-08 | Stop reason: HOSPADM

## 2020-07-06 RX ORDER — ASPIRIN 81 MG/1
81 TABLET ORAL DAILY
COMMUNITY
End: 2021-01-27

## 2020-07-06 RX ORDER — MORPHINE SULFATE 2 MG/ML
2 INJECTION, SOLUTION INTRAMUSCULAR; INTRAVENOUS
Status: ACTIVE | OUTPATIENT
Start: 2020-07-06 | End: 2020-07-07

## 2020-07-06 RX ORDER — MISOPROSTOL 200 UG/1
800 TABLET ORAL AS NEEDED
Status: DISCONTINUED | OUTPATIENT
Start: 2020-07-06 | End: 2020-07-06 | Stop reason: HOSPADM

## 2020-07-06 RX ORDER — NALOXONE HCL 0.4 MG/ML
0.2 VIAL (ML) INJECTION
Status: DISCONTINUED | OUTPATIENT
Start: 2020-07-06 | End: 2020-07-08 | Stop reason: HOSPADM

## 2020-07-06 RX ORDER — CEFAZOLIN SODIUM IN 0.9 % NACL 3 G/100 ML
3 INTRAVENOUS SOLUTION, PIGGYBACK (ML) INTRAVENOUS ONCE
Status: COMPLETED | OUTPATIENT
Start: 2020-07-06 | End: 2020-07-06

## 2020-07-06 RX ORDER — ONDANSETRON 2 MG/ML
4 INJECTION INTRAMUSCULAR; INTRAVENOUS ONCE AS NEEDED
Status: DISCONTINUED | OUTPATIENT
Start: 2020-07-06 | End: 2020-07-08 | Stop reason: HOSPADM

## 2020-07-06 RX ORDER — CARBOPROST TROMETHAMINE 250 UG/ML
250 INJECTION, SOLUTION INTRAMUSCULAR AS NEEDED
Status: DISCONTINUED | OUTPATIENT
Start: 2020-07-06 | End: 2020-07-06 | Stop reason: HOSPADM

## 2020-07-06 RX ORDER — OXYTOCIN-SODIUM CHLORIDE 0.9% IV SOLN 30 UNIT/500ML 30-0.9/5 UT/ML-%
999 SOLUTION INTRAVENOUS ONCE
Status: COMPLETED | OUTPATIENT
Start: 2020-07-06 | End: 2020-07-06

## 2020-07-06 RX ORDER — FENTANYL CITRATE 50 UG/ML
INJECTION, SOLUTION INTRAMUSCULAR; INTRAVENOUS
Status: COMPLETED | OUTPATIENT
Start: 2020-07-06 | End: 2020-07-06

## 2020-07-06 RX ORDER — CLONAZEPAM 0.5 MG/1
0.5 TABLET ORAL NIGHTLY PRN
Status: DISCONTINUED | OUTPATIENT
Start: 2020-07-06 | End: 2020-07-08 | Stop reason: HOSPADM

## 2020-07-06 RX ORDER — DIPHENHYDRAMINE HCL 25 MG
25 CAPSULE ORAL EVERY 4 HOURS PRN
Status: DISCONTINUED | OUTPATIENT
Start: 2020-07-06 | End: 2020-07-08 | Stop reason: HOSPADM

## 2020-07-06 RX ORDER — ACETAMINOPHEN 500 MG
1000 TABLET ORAL ONCE
Status: COMPLETED | OUTPATIENT
Start: 2020-07-06 | End: 2020-07-06

## 2020-07-06 RX ORDER — DIPHENHYDRAMINE HYDROCHLORIDE 50 MG/ML
25 INJECTION INTRAMUSCULAR; INTRAVENOUS EVERY 4 HOURS PRN
Status: DISCONTINUED | OUTPATIENT
Start: 2020-07-06 | End: 2020-07-08 | Stop reason: HOSPADM

## 2020-07-06 RX ORDER — LIDOCAINE HYDROCHLORIDE 10 MG/ML
5 INJECTION, SOLUTION EPIDURAL; INFILTRATION; INTRACAUDAL; PERINEURAL AS NEEDED
Status: DISCONTINUED | OUTPATIENT
Start: 2020-07-06 | End: 2020-07-06 | Stop reason: HOSPADM

## 2020-07-06 RX ORDER — SIMETHICONE 80 MG
80 TABLET,CHEWABLE ORAL 4 TIMES DAILY PRN
Status: DISCONTINUED | OUTPATIENT
Start: 2020-07-06 | End: 2020-07-08 | Stop reason: HOSPADM

## 2020-07-06 RX ORDER — ONDANSETRON 4 MG/1
4 TABLET, FILM COATED ORAL EVERY 8 HOURS PRN
Status: DISCONTINUED | OUTPATIENT
Start: 2020-07-06 | End: 2020-07-08 | Stop reason: HOSPADM

## 2020-07-06 RX ORDER — IBUPROFEN 600 MG/1
600 TABLET ORAL EVERY 8 HOURS PRN
Status: DISCONTINUED | OUTPATIENT
Start: 2020-07-06 | End: 2020-07-07

## 2020-07-06 RX ORDER — BUPIVACAINE HYDROCHLORIDE 7.5 MG/ML
INJECTION, SOLUTION EPIDURAL; RETROBULBAR
Status: COMPLETED | OUTPATIENT
Start: 2020-07-06 | End: 2020-07-06

## 2020-07-06 RX ORDER — SODIUM CHLORIDE 0.9 % (FLUSH) 0.9 %
10 SYRINGE (ML) INJECTION AS NEEDED
Status: DISCONTINUED | OUTPATIENT
Start: 2020-07-06 | End: 2020-07-06 | Stop reason: HOSPADM

## 2020-07-06 RX ORDER — METHYLERGONOVINE MALEATE 0.2 MG/ML
200 INJECTION INTRAVENOUS ONCE AS NEEDED
Status: DISCONTINUED | OUTPATIENT
Start: 2020-07-06 | End: 2020-07-06 | Stop reason: HOSPADM

## 2020-07-06 RX ORDER — ERYTHROMYCIN 5 MG/G
OINTMENT OPHTHALMIC
Status: DISPENSED
Start: 2020-07-06 | End: 2020-07-06

## 2020-07-06 RX ORDER — SODIUM CHLORIDE, SODIUM LACTATE, POTASSIUM CHLORIDE, CALCIUM CHLORIDE 600; 310; 30; 20 MG/100ML; MG/100ML; MG/100ML; MG/100ML
125 INJECTION, SOLUTION INTRAVENOUS CONTINUOUS
Status: DISCONTINUED | OUTPATIENT
Start: 2020-07-06 | End: 2020-07-06

## 2020-07-06 RX ORDER — FAMOTIDINE 10 MG/ML
20 INJECTION, SOLUTION INTRAVENOUS ONCE AS NEEDED
Status: COMPLETED | OUTPATIENT
Start: 2020-07-06 | End: 2020-07-06

## 2020-07-06 RX ORDER — OXYCODONE HYDROCHLORIDE AND ACETAMINOPHEN 5; 325 MG/1; MG/1
1 TABLET ORAL EVERY 4 HOURS PRN
Status: DISCONTINUED | OUTPATIENT
Start: 2020-07-06 | End: 2020-07-08 | Stop reason: HOSPADM

## 2020-07-06 RX ADMIN — OXYTOCIN 999 ML/HR: 10 INJECTION INTRAVENOUS at 11:28

## 2020-07-06 RX ADMIN — ONDANSETRON 4 MG: 2 INJECTION INTRAMUSCULAR; INTRAVENOUS at 10:32

## 2020-07-06 RX ADMIN — FAMOTIDINE 20 MG: 10 INJECTION INTRAVENOUS at 10:32

## 2020-07-06 RX ADMIN — CEFAZOLIN 3 G: 1 INJECTION, POWDER, FOR SOLUTION INTRAMUSCULAR; INTRAVENOUS; PARENTERAL at 10:56

## 2020-07-06 RX ADMIN — SODIUM CHLORIDE, POTASSIUM CHLORIDE, SODIUM LACTATE AND CALCIUM CHLORIDE 1000 ML: 600; 310; 30; 20 INJECTION, SOLUTION INTRAVENOUS at 09:02

## 2020-07-06 RX ADMIN — EPHEDRINE SULFATE 5 MG: 50 INJECTION INTRAVENOUS at 11:13

## 2020-07-06 RX ADMIN — OXYCODONE HYDROCHLORIDE AND ACETAMINOPHEN 1 TABLET: 5; 325 TABLET ORAL at 15:47

## 2020-07-06 RX ADMIN — SODIUM CHLORIDE, POTASSIUM CHLORIDE, SODIUM LACTATE AND CALCIUM CHLORIDE 125 ML/HR: 600; 310; 30; 20 INJECTION, SOLUTION INTRAVENOUS at 09:59

## 2020-07-06 RX ADMIN — SODIUM CHLORIDE, POTASSIUM CHLORIDE, SODIUM LACTATE AND CALCIUM CHLORIDE: 600; 310; 30; 20 INJECTION, SOLUTION INTRAVENOUS at 12:12

## 2020-07-06 RX ADMIN — EPHEDRINE SULFATE 5 MG: 50 INJECTION INTRAVENOUS at 11:20

## 2020-07-06 RX ADMIN — IBUPROFEN 600 MG: 600 TABLET, FILM COATED ORAL at 15:47

## 2020-07-06 RX ADMIN — CEFAZOLIN 3 G: 1 INJECTION, POWDER, FOR SOLUTION INTRAMUSCULAR; INTRAVENOUS; PARENTERAL at 11:14

## 2020-07-06 RX ADMIN — FENTANYL CITRATE 10 MCG: 50 INJECTION INTRAMUSCULAR; INTRAVENOUS at 11:06

## 2020-07-06 RX ADMIN — ACETAMINOPHEN 1000 MG: 500 TABLET, FILM COATED ORAL at 10:32

## 2020-07-06 RX ADMIN — PHENYLEPHRINE HYDROCHLORIDE 50 MCG: 10 INJECTION INTRAVENOUS at 11:16

## 2020-07-06 RX ADMIN — MORPHINE SULFATE 100 MCG: 1 INJECTION, SOLUTION EPIDURAL; INTRATHECAL; INTRAVENOUS at 11:06

## 2020-07-06 RX ADMIN — PHENYLEPHRINE HYDROCHLORIDE 50 MCG: 10 INJECTION INTRAVENOUS at 11:10

## 2020-07-06 RX ADMIN — PHENYLEPHRINE HYDROCHLORIDE 50 MCG: 10 INJECTION INTRAVENOUS at 11:49

## 2020-07-06 RX ADMIN — OXYTOCIN 125 ML/HR: 10 INJECTION, SOLUTION INTRAMUSCULAR; INTRAVENOUS at 12:50

## 2020-07-06 RX ADMIN — BUPIVACAINE HYDROCHLORIDE 1.7 ML: 7.5 INJECTION, SOLUTION EPIDURAL; RETROBULBAR at 11:06

## 2020-07-06 RX ADMIN — PHENYLEPHRINE HYDROCHLORIDE 50 MCG: 10 INJECTION INTRAVENOUS at 11:23

## 2020-07-06 NOTE — ANESTHESIA POSTPROCEDURE EVALUATION
"Patient: Shirin Hernandes    Procedure Summary     Date:  20 Room / Location:   [facility]   [facility]    Anesthesia Start:  1102 Anesthesia Stop:  121    Procedure:   SECTION REPEAT WITH TUBAL (N/A Abdomen) Diagnosis:      Surgeon:  Yudy Gupta MD Provider:  Estephania Jeffers MD    Anesthesia Type:  spinal ASA Status:  3          Anesthesia Type: spinal    Vitals  Vitals Value Taken Time   /78 2020  2:18 PM   Temp 36.3 °C (97.4 °F) 2020 12:18 PM   Pulse 85 2020  2:23 PM   Resp 17 2020 12:18 PM   SpO2 99 % 2020  2:23 PM   Vitals shown include unvalidated device data.        Post Anesthesia Care and Evaluation    Patient location during evaluation: bedside  Patient participation: complete - patient participated  Level of consciousness: awake  Pain management: adequate  Airway patency: patent  Anesthetic complications: No anesthetic complications    Cardiovascular status: acceptable  Respiratory status: acceptable  Hydration status: acceptable    Comments: */79   Pulse 84   Temp 36.3 °C (97.4 °F) (Oral)   Resp 17   Ht 170.2 cm (67\")   Wt 125 kg (276 lb)   SpO2 98%   BMI 43.23 kg/m²         "

## 2020-07-06 NOTE — ANESTHESIA PREPROCEDURE EVALUATION
Anesthesia Evaluation     no history of anesthetic complications:               Airway   no difficulty expected  Dental - normal exam     Pulmonary - normal exam   (+) a smoker Former,   (-) COPD, asthma, sleep apnea    PE comment: nonlabored  Cardiovascular - normal exam    Rhythm: regular  Rate: normal    (+) hypertension,   (-) valvular problems/murmurs, past MI, CAD, dysrhythmias, angina      Neuro/Psych  (+) headaches (migraines), dizziness/light headedness (orthostatic), psychiatric history Anxiety,     (-) seizures, TIA, CVA  GI/Hepatic/Renal/Endo    (+)  GERD, GI bleeding (h/o rectal bleed) ,   (-) liver disease, no renal disease, diabetes, no thyroid disorder    ROS Comment: Ulcerative colitis    Musculoskeletal (-) negative ROS    Abdominal    Substance History      OB/GYN    (+) Pregnant (@39wks 4days),     Comment: Previous       Other                        Anesthesia Plan    ASA 3     spinal   (I have reviewed the patient's history with the patient and the chart, including all pertinent laboratory results and imaging. I have explained the risks of spinal anesthesia including but not limited to hypotension, PDPH, nerve injury and risk of converting to GA. Patient understands risks and agrees to proceed.  )    Anesthetic plan, all risks, benefits, and alternatives have been provided, discussed and informed consent has been obtained with: patient.

## 2020-07-06 NOTE — ANESTHESIA PROCEDURE NOTES
Spinal Block      Patient reassessed immediately prior to procedure    Start Time: 7/6/2020 11:00 AM  Stop Time: 7/6/2020 11:06 AM  Preanesthetic Checklist  Completed: patient identified, site marked, surgical consent, pre-op evaluation, timeout performed, IV checked, risks and benefits discussed and monitors and equipment checked  Spinal Block Prep:  Sterile Tech:cap, gloves and sterile barriers  Patient Monitoring:EKG, continuous pulse oximetry and blood pressure monitoring  Spinal Block Procedure  Approach:midline  Guidance:landmark technique  Location:L4-L5  Needle Type:Argenis  Needle Gauge:25 G  Placement of Spinal needle event:cerebrospinal fluid aspirated  Paresthesia: no  Fluid Appearance:clear  Medications: fentaNYL citrate (PF) (SUBLIMAZE) injection, 10 mcg  Morphine PF injection, 100 mcg  bupivacaine PF (MARCAINE) 0.75 % injection, 1.7 mL  Med Administered at 7/6/2020 11:06 AM   Post Assessment  Patient Tolerance:patient tolerated the procedure well with no apparent complications  Complications no

## 2020-07-06 NOTE — L&D DELIVERY NOTE
[facility]   Section Operative Note    Pre-Operative Dx:   1.  IUP at 39w4d weeks   2. Prior C/S h/o CD   3. UDF        Postoperative Dx:  Same        Procedure: , Low Transverse Repeat CD and PPS   Surgeon: Yudy Gupta     Assistant: Dr Parkinson   Anesthesia: Spinal                  Findings:                           Amniotic Fluid clear  Uterus normal  Tubes and ovaries normal bilaterally              Infant:            Gender: Viable female  infant     Weight: 3545 g (7 lb 13 oz)     Apgars: 8   @ 1 minute /     9   @ 5 minutes                 Indication for C/Section:     Prior C/S                Procedure Details:  The patient was taken to the operating room where spinal anesthesia was found to be adequate. She was prepped and draped in the usual sterile manner in the dorsal supine position with leftward tilt. A Pfannenstiel incision was made and carried down to the fascia. The fascia was incised in the mid-line and extended transversely with Negrete scissors. The fascia was grasped and elevated and  from the underlying rectus muscles superiorly and inferiorly. The peritoneum was identified and entered. Peritoneal incision was extended superiorly and inferiorly with good visualization of the bladder. The bladder blade was inserted and the vesicouterine peritoneum was incised transversely and the bladder flap was created digitally. The bladder blade was reinserted. The  lower uterine segment was incised in a transverse fashion.  The head was elevated and delivered through the incision. The remainder of the infant was delivered without difficulty. The umbilical cord was clamped and cut and the infant was handed off the field. Cord ph and cord bloods were obtained. The placenta was removed intact and appeared normal. The uterine incision was closed with running locked sutures of 0 monocryl. The abdominal cavity was irrigated and cleared of all clot and debris. The uterine incision  was inspected and noted to be hemostatic.    Bilateral tubes where clamped/cut and suture ligated. Hemostasis assured.     Noted serosal tear on the back of the uterus that required figure of 8 with 3-0 chromic and surgicel/ Hemostasis assured.      Rectus muscles were reapproximated in the midline with 0 chromic.  The fascia was closed with 0 PDS in running continuous fashion. The subcutaneous tissue was closed with 3-0 chromic. The skin was closed in subcuticular fashion with 4-0 Monocryl.   Instrument, sponge, and needle counts were correct prior the abdominal closure and at the conclusion of the case. Mother and baby  to recovery room in stable condition.              Complications:     None        Antibiotics: cefazolin (Ancef)                      Yudy Gupta MD  7/9/2020  17:04

## 2020-07-06 NOTE — PLAN OF CARE
Pt. Recovering in LD PACU. Patient stable at this time. Per MD orders, no fundal massage or expression from the uterus. Uterus is middle at the umbilicus with light bleeding. Pain a 1/10.

## 2020-07-06 NOTE — H&P
[facility]  Obstetric Preop History and Physical:    Patient Name: Shirin Hernandes  :  1985  MRN:  [id number]          Subjective                34 y.o. female  for repeat CD/PPS.          Past OB History:       OB History    Para Term  AB Living   5 2 2 0 2 2   SAB TAB Ectopic Molar Multiple Live Births   1 0 0 0 0 2      # Outcome Date GA Lbr Michael/2nd Weight Sex Delivery Anes PTL Lv   5 Current            4 Term 17 39w3d  3405 g (7 lb 8.1 oz) M CS-LTranv Spinal N LA      Birth Comments: panda or 2      Name: KAMILLA HERNANDES      Apgar1: 8  Apgar5: 9   3 Term 09/11/15 37w4d   M CS-LTranv   LA   2 AB 2014 5w0d    SAB      1 SAB 14               Past Medical History: Past Medical History:   Diagnosis Date   • Anxiety    • GERD (gastroesophageal reflux disease)    • Headache    • Hx of migraines    • Hypertension    • NSAID sensitivity     Cannot take NSAIDs while taking LIALDA   • Peptic ulceration    • Ulcerative colitis (CMS/HCC)       Past Surgical History Past Surgical History:   Procedure Laterality Date   •  SECTION  2015   •  SECTION N/A 2017    Procedure:  SECTION REPEAT;  Surgeon: Aron Valencia MD;  Location: [facility] LABOR DELIVERY;  Service:    • CHOLECYSTECTOMY WITH INTRAOPERATIVE CHOLANGIOGRAM N/A 2016    Procedure: CHOLECYSTECTOMY LAPAROSCOPIC WITH INTRAOPERATIVE CHOLANGIOGRAM AND LIVER BIOPSY ;  Surgeon: Dieudonne Lamb MD;  Location: [facility] OR [facility];  Service:    • COLONOSCOPY W/ BIOPSIES N/A 2016    Dr. Rom Wilks - Ulcerative Colitis per pt    • D&C CERVICAL BIOPSY  2014      Family History: Family History   Problem Relation Age of Onset   • Myocarditis Mother    • Hypertension Father    • Diabetes Maternal Uncle    • Aneurysm Paternal Aunt    • Pancreatic cancer Paternal Aunt    • Diabetes Maternal Grandmother    • Diabetes Maternal Grandfather    • Alcohol abuse Paternal Grandfather    •  Diabetes Maternal Uncle       Social History:  reports that she has quit smoking. She has never used smokeless tobacco.   reports that she does not drink alcohol.   reports that she does not use drugs.    Allergies:     Amoxicillin and Benzoyl peroxide     Objective       Vital Signs       Stable afebrile/        Physical Exam:  General: Alert in NAD   Heart Normal rate and regular rhythm   Lungs Clear to auscultation bilaterally     Abdomen Gravid, soft, no masses   Extremities Mod edema                                                      FHR:   TOCO: reassuring  No contractions         Assessment/Plan     Assessment: IUP at term for repeat CD and PPS/    Plan: Recommend proceeding with  section for delivery. Indications for proceeding, risks and benefits have been discussed with patient.  All questions answered.         Yudy Gupta MD  2020  08:47

## 2020-07-06 NOTE — NON STRESS TEST
Shirin Hernandes, a  at 39w4d with an CHAPINCITO of 2020, by Patient Reported, was seen at [facility] LABOR DELIVERY for a nonstress test.    Chief Complaint   Patient presents with   • Scheduled      Patient presents to L&D for scheduled repeat c/s anf PPS. Patient reports +Fm, denies LOF or VB.       Patient Active Problem List   Diagnosis   • Ulcerative colitis without complications (CMS/HCC)   • Previous  delivery, antepartum   • Chronic hypertension in pregnancy   • Abdominal pain, right upper quadrant   • Abnormal CT of the abdomen   • Altered bowel function   • Menstrual migraine without status migrainosus, not intractable   • Rectal bleeding   • Orthostatic dizziness   • Essential hypertension   • Pregnancy       Start Time: 0900  Stop Time: 1030    Interpretation A  Nonstress Test Interpretation A: Reactive (20 1030 : Pam Pierce RN)           Afebrile, all other vital signs stable thus far. On RA. No complaints of dizziness, SOB, N/V or pain thus far. Up self in room, steady on feet- voiding adequately. Needing 20 mEq of potassium replaced this AM. Slept well throughout the night. Will continue to monitor closely.     Problem: Adult Inpatient Plan of Care  Goal: Plan of Care Review  4/8/2019 0611 by Bruna Villarreal, RN  Outcome: No Change     Problem: Adult Inpatient Plan of Care  Goal: Optimal Comfort and Wellbeing  Outcome: No Change     Problem: Adult Inpatient Plan of Care  Goal: Readiness for Transition of Care  Outcome: No Change     Problem: Bleeding Risk or Actual  Goal: Absence of Bleeding  Outcome: No Change     Problem: Anemia (Chemotherapy Effects)  Goal: Anemia Symptom Improvement  4/8/2019 0611 by Bruna Villarreal, RN  Outcome: No Change     Problem: Nausea and Vomiting (Chemotherapy Effects)  Goal: Fluid and Electrolyte Balance  Outcome: No Change     Problem: Neutropenia (Chemotherapy Effects)  Goal: Absence of Infection  Outcome: No Change     Problem: Oral Mucositis (Chemotherapy Effects)  Goal: Improved Oral Mucous Membrane Integrity  Outcome: No Change

## 2020-07-07 LAB
BASOPHILS # BLD AUTO: 0.02 10*3/MM3 (ref 0–0.2)
BASOPHILS NFR BLD AUTO: 0.2 % (ref 0–1.5)
DEPRECATED RDW RBC AUTO: 40.5 FL (ref 37–54)
EOSINOPHIL # BLD AUTO: 0.13 10*3/MM3 (ref 0–0.4)
EOSINOPHIL NFR BLD AUTO: 1.1 % (ref 0.3–6.2)
ERYTHROCYTE [DISTWIDTH] IN BLOOD BY AUTOMATED COUNT: 13.7 % (ref 12.3–15.4)
HCT VFR BLD AUTO: 31.1 % (ref 34–46.6)
HGB BLD-MCNC: 10.5 G/DL (ref 12–15.9)
IMM GRANULOCYTES # BLD AUTO: 0.12 10*3/MM3 (ref 0–0.05)
IMM GRANULOCYTES NFR BLD AUTO: 1.1 % (ref 0–0.5)
LAB AP CASE REPORT: NORMAL
LYMPHOCYTES # BLD AUTO: 0.76 10*3/MM3 (ref 0.7–3.1)
LYMPHOCYTES NFR BLD AUTO: 6.7 % (ref 19.6–45.3)
MCH RBC QN AUTO: 27.6 PG (ref 26.6–33)
MCHC RBC AUTO-ENTMCNC: 33.8 G/DL (ref 31.5–35.7)
MCV RBC AUTO: 81.6 FL (ref 79–97)
MONOCYTES # BLD AUTO: 0.51 10*3/MM3 (ref 0.1–0.9)
MONOCYTES NFR BLD AUTO: 4.5 % (ref 5–12)
NEUTROPHILS NFR BLD AUTO: 86.4 % (ref 42.7–76)
NEUTROPHILS NFR BLD AUTO: 9.83 10*3/MM3 (ref 1.7–7)
NRBC BLD AUTO-RTO: 0 /100 WBC (ref 0–0.2)
PATH REPORT.FINAL DX SPEC: NORMAL
PATH REPORT.GROSS SPEC: NORMAL
PLATELET # BLD AUTO: 167 10*3/MM3 (ref 140–450)
PMV BLD AUTO: 9.4 FL (ref 6–12)
RBC # BLD AUTO: 3.81 10*6/MM3 (ref 3.77–5.28)
WBC # BLD AUTO: 11.37 10*3/MM3 (ref 3.4–10.8)

## 2020-07-07 PROCEDURE — 85025 COMPLETE CBC W/AUTO DIFF WBC: CPT | Performed by: OBSTETRICS & GYNECOLOGY

## 2020-07-07 RX ORDER — DOCUSATE SODIUM 100 MG/1
100 CAPSULE, LIQUID FILLED ORAL 2 TIMES DAILY
Status: DISCONTINUED | OUTPATIENT
Start: 2020-07-07 | End: 2020-07-08 | Stop reason: HOSPADM

## 2020-07-07 RX ORDER — IBUPROFEN 600 MG/1
600 TABLET ORAL EVERY 6 HOURS PRN
Status: DISCONTINUED | OUTPATIENT
Start: 2020-07-07 | End: 2020-07-08 | Stop reason: HOSPADM

## 2020-07-07 RX ORDER — LABETALOL 100 MG/1
100 TABLET, FILM COATED ORAL EVERY 12 HOURS SCHEDULED
Status: DISCONTINUED | OUTPATIENT
Start: 2020-07-07 | End: 2020-07-08

## 2020-07-07 RX ADMIN — SIMETHICONE CHEW TAB 80 MG 80 MG: 80 TABLET ORAL at 17:18

## 2020-07-07 RX ADMIN — DOCUSATE SODIUM 100 MG: 100 CAPSULE, LIQUID FILLED ORAL at 21:05

## 2020-07-07 RX ADMIN — IBUPROFEN 600 MG: 600 TABLET, FILM COATED ORAL at 21:04

## 2020-07-07 RX ADMIN — IBUPROFEN 600 MG: 600 TABLET, FILM COATED ORAL at 00:19

## 2020-07-07 RX ADMIN — OXYCODONE HYDROCHLORIDE AND ACETAMINOPHEN 1 TABLET: 10; 325 TABLET ORAL at 17:18

## 2020-07-07 RX ADMIN — OXYCODONE HYDROCHLORIDE AND ACETAMINOPHEN 1 TABLET: 10; 325 TABLET ORAL at 21:04

## 2020-07-07 RX ADMIN — LABETALOL HCL 100 MG: 100 TABLET, FILM COATED ORAL at 21:05

## 2020-07-07 RX ADMIN — IBUPROFEN 600 MG: 600 TABLET, FILM COATED ORAL at 08:07

## 2020-07-07 RX ADMIN — IBUPROFEN 600 MG: 600 TABLET, FILM COATED ORAL at 15:02

## 2020-07-07 RX ADMIN — LABETALOL HCL 100 MG: 100 TABLET, FILM COATED ORAL at 09:58

## 2020-07-07 RX ADMIN — DOCUSATE SODIUM 100 MG: 100 CAPSULE, LIQUID FILLED ORAL at 09:58

## 2020-07-07 RX ADMIN — OXYCODONE HYDROCHLORIDE AND ACETAMINOPHEN 1 TABLET: 5; 325 TABLET ORAL at 13:04

## 2020-07-07 RX ADMIN — SIMETHICONE CHEW TAB 80 MG 80 MG: 80 TABLET ORAL at 08:07

## 2020-07-07 RX ADMIN — CLONAZEPAM 0.5 MG: 0.5 TABLET ORAL at 00:19

## 2020-07-07 RX ADMIN — OXYCODONE HYDROCHLORIDE AND ACETAMINOPHEN 1 TABLET: 5; 325 TABLET ORAL at 08:07

## 2020-07-07 NOTE — PROGRESS NOTES
[facility]   PROGRESS NOTE    Patient Name: Shirin Hernandes  :  1985  MRN:  [id number]      Post-Op Day 1 S/P    Delivered a female infant.  Subjective     Patient reports:    Pain is well controlled. Voiding and ambulating without difficulty.    Tolerating po. Lochia normal.       The patient plans to breastfeed.         Objective       Vitals: Vital Signs Range for the last 24 hours  Temperature: Temp:  [97.3 °F (36.3 °C)-98.3 °F (36.8 °C)] 98.1 °F (36.7 °C)   Temp Source: Temp src: Oral   BP: BP: (122-150)/(63-93) 144/93   Pulse: Heart Rate:  [] 98   Respirations: Resp:  [16-20] 18         Intake/Output Summary (Last 24 hours) at 2020 1003  Last data filed at 2020 0625  Gross per 24 hour   Intake 3234 ml   Output 4654 ml   Net -1420 ml                                              Physical Exam     General Alert and awake, in NAD      CV Regular rate and rhythm      Lungs clear to auscultation bilaterally        Abdomen Soft, non-distended, fundus firm,  1 below umbilicus, appropriately tender      Incision  Dressing clean, dry and intact.      Extremities  tr edema, calves NT               LABS: Results from last 7 days   Lab Units 20  0539 20  0912   WBC 10*3/mm3 11.37* 10.26   HEMOGLOBIN g/dL 10.5* 11.6*   HEMATOCRIT % 31.1* 34.8   PLATELETS 10*3/mm3 167 209         Prenatal labs results reviewed:  Yes   Rubella:  Immune  Rh Status:    RH type   Date Value Ref Range Status   2020 Positive  Final                       Assessment/Plan   : 1. POD 1 S/P C/S: Hemodynamically stable.  Doing well.  Continue routine care.     CHTN on labetalol      Pregnancy          Suzi Doll, APRSHAQUILLE  2020  10:03   Patient seen and examined. Agree with above assessment.     Merced Rodrigues MD  2020  14:46

## 2020-07-08 VITALS
HEIGHT: 67 IN | SYSTOLIC BLOOD PRESSURE: 123 MMHG | HEART RATE: 91 BPM | WEIGHT: 276 LBS | BODY MASS INDEX: 43.32 KG/M2 | OXYGEN SATURATION: 99 % | TEMPERATURE: 97.7 F | DIASTOLIC BLOOD PRESSURE: 82 MMHG | RESPIRATION RATE: 18 BRPM

## 2020-07-08 RX ORDER — LABETALOL 200 MG/1
200 TABLET, FILM COATED ORAL EVERY 12 HOURS SCHEDULED
Status: DISCONTINUED | OUTPATIENT
Start: 2020-07-08 | End: 2020-07-08 | Stop reason: HOSPADM

## 2020-07-08 RX ORDER — IBUPROFEN 600 MG/1
600 TABLET ORAL EVERY 6 HOURS PRN
Qty: 30 TABLET | Refills: 0 | Status: SHIPPED | OUTPATIENT
Start: 2020-07-08 | End: 2021-01-27

## 2020-07-08 RX ORDER — LABETALOL 100 MG/1
100 TABLET, FILM COATED ORAL ONCE
Status: COMPLETED | OUTPATIENT
Start: 2020-07-08 | End: 2020-07-08

## 2020-07-08 RX ORDER — LABETALOL 200 MG/1
200 TABLET, FILM COATED ORAL EVERY 12 HOURS SCHEDULED
Status: DISCONTINUED | OUTPATIENT
Start: 2020-07-08 | End: 2020-07-08

## 2020-07-08 RX ADMIN — OXYCODONE HYDROCHLORIDE AND ACETAMINOPHEN 1 TABLET: 10; 325 TABLET ORAL at 08:29

## 2020-07-08 RX ADMIN — DOCUSATE SODIUM 100 MG: 100 CAPSULE, LIQUID FILLED ORAL at 08:26

## 2020-07-08 RX ADMIN — SIMETHICONE CHEW TAB 80 MG 80 MG: 80 TABLET ORAL at 03:22

## 2020-07-08 RX ADMIN — IBUPROFEN 600 MG: 600 TABLET, FILM COATED ORAL at 09:35

## 2020-07-08 RX ADMIN — OXYCODONE HYDROCHLORIDE AND ACETAMINOPHEN 1 TABLET: 10; 325 TABLET ORAL at 03:19

## 2020-07-08 RX ADMIN — LABETALOL HCL 100 MG: 100 TABLET, FILM COATED ORAL at 09:35

## 2020-07-08 RX ADMIN — IBUPROFEN 600 MG: 600 TABLET, FILM COATED ORAL at 03:19

## 2020-07-08 RX ADMIN — CLONAZEPAM 0.5 MG: 0.5 TABLET ORAL at 00:42

## 2020-07-08 RX ADMIN — LABETALOL HCL 100 MG: 100 TABLET, FILM COATED ORAL at 08:26

## 2020-07-08 NOTE — DISCHARGE SUMMARY
Date of Discharge:  2020    Discharge Diagnosis: repeat     Presenting Problem/History of Present Illness  Pregnancy [Z34.90]       Hospital Course  Patient is a 34 y.o. female presented with repeat  with delivery of viable female infant.      Procedures Performed  Procedure(s):   SECTION REPEAT WITH TUBAL         Condition on Discharge:  Post-Op Day 2 S/P   Subjective     Patient reports:    Doing well - ready for discharge. Pain well controlled. Tolerating po and   having flatus. Voiding and ambulating without difficulty. Lochia normal.     Vital Signs  Temp:  [97.6 °F (36.4 °C)-98.8 °F (37.1 °C)] 97.7 °F (36.5 °C)  Heart Rate:  [] 93  Resp:  [16-18] 16  BP: (120-153)/(78-95) 132/95    Physical Exam    Gen Alert and awake   Abdomen Soft, ND,  Fundus firm with minimal tenderness   Incision  Intact, without erythema or exudate   Extremities Calves NT bilaterally     Assessment/Plan ]   Assessment:  POD 2  -  Doing well. Stable for discharge.     Plan:    Instructions reviewed       Consults:   Consults     No orders found from 2020 to 2020.          Discharge Disposition  Home or Self Care    Discharge Medications     Discharge Medications      New Medications      Instructions Start Date   ibuprofen 600 MG tablet  Commonly known as:  ADVIL,MOTRIN   600 mg, Oral, Every 6 Hours PRN         Changes to Medications      Instructions Start Date   Lialda 1.2 g EC tablet  Generic drug:  mesalamine  What changed:    · how much to take  · when to take this   1.2 g, Oral, 4 Times Daily         Continue These Medications      Instructions Start Date   aspirin 81 MG EC tablet   81 mg, Oral, Daily      docusate sodium 100 MG capsule  Commonly known as:  COLACE   100 mg, Oral, 3 Times Daily      labetalol 100 MG tablet  Commonly known as:  NORMODYNE   No dose, route, or frequency recorded.      omeprazole 20 MG capsule  Commonly known as:  priLOSEC   20 mg, Oral, Daily       prenatal (CLASSIC) vitamin  tablet  Generic drug:  prenatal vitamin   Oral, Daily      progesterone 200 MG capsule  Commonly known as:  PROMETRIUM   No dose, route, or frequency recorded.      SUMAtriptan 100 MG tablet  Commonly known as:  Imitrex   Take one tablet at onset of headache. May repeat dose one time in 2 hours if headache not relieved.             The patient has been prescribed a controlled substance.  She has been counseled on the risks associated with using the medication.   The addictive potential of this medication and alternatives were discussed carefully with this patient and she demonstrated understanding.  A SUSAN report has been obtained and reviewed.    Activity at Discharge: restrictions reviewed    Follow-up Appointments  No future appointments.       Doing well. BP mildly elevated- 138/90, 153/90, 120/78. Will increase labetalol back to home dosage of 200 mg every 12 hours of labetalol. Denies headaches or blurred vision. Pt struggled with postpartum anxiety for two weeks with first pregnancy. Took Klonopin last night which helped. Will send home with for 10 days. Educated to call if anxiety worsens. Will f/u in office 1 week for BP check    Test Results Pending at Discharge       POLLO Burleson  07/08/20  09:17

## 2020-07-08 NOTE — LACTATION NOTE
P3T. Mom reports baby is nursing better than her others at this stage. She has had a history of low supply but said the pumping really stressed her out so she wants to avoid it with this baby. Catrina Baby taking photos and then family is discharging.  Lactation Consult Note    Evaluation Completed: 2020 10:04  Patient Name: Shirin Hernandes  :  1985  MRN:  [id number]     REFERRAL  INFORMATION:                          Date of Referral: 20   Person Making Referral: nurse  Maternal Reason for Referral: breastfeeding currently       DELIVERY HISTORY:          Skin to skin initiation date/time: 2020  11:50 AM   Skin to skin end date/time:              MATERNAL ASSESSMENT:  Breast Size Issue: none (20 : Evelin Hill RN)           Nipples: everted (20 : Evelin Hill, RN)     Left Nipple Symptoms: tender (20 : Evelin Hill, RN)  Right Nipple Symptoms: tender (20 : Evelin Hill, RN)       INFANT ASSESSMENT:  Information for the patient's :  Judie HernandesOralia [[id number]]   No past medical history on file.                                                                                                                                MATERNAL INFANT FEEDING:                                       Latch Assistance: no (20 : Evelin Hill, RN)                               EQUIPMENT TYPE:  Breast Pump Type: double electric, personal (20 : Evelin Hill, RN)          Breast Care: Breastfeeding: breast milk to nipples (20 : Evelin Hill, RN)        Breastfeeding Support: encouragement provided, lactation counseling provided, maternal hydration promoted (20 : Evelin Hill, RN)          BREAST PUMPING:          LACTATION REFERRALS:  Lactation Referrals: outpatient lactation program (20 : Evelin Hill, RN)

## 2020-07-08 NOTE — PLAN OF CARE
Problem: Patient Care Overview  Goal: Plan of Care Review  Outcome: Ongoing (interventions implemented as appropriate)  Flowsheets (Taken 7/8/2020 0150)  Progress: improving  Plan of Care Reviewed With: patient  Outcome Summary: VSS; pain well controlled with po meds; pt a little anxious tonight about increased BP and baby not peeing in awhile - clonopin given; breastfeeding often; monitoring BPs closely

## 2020-09-25 ENCOUNTER — TELEPHONE (OUTPATIENT)
Dept: FAMILY MEDICINE CLINIC | Facility: CLINIC | Age: 35
End: 2020-09-25

## 2020-09-25 NOTE — TELEPHONE ENCOUNTER
PT CALLED STATING SHE SENT A [employer] MESSAGE TO HER PROVIDER DISCUSSING A MEDICAITON,. HE HAS NOT RESPONDED, SHE WAS HOPING TO HEAR SOMETHING BACK BEFORE THE WEEKEND.

## 2020-09-28 ENCOUNTER — TELEPHONE (OUTPATIENT)
Dept: FAMILY MEDICINE CLINIC | Facility: CLINIC | Age: 35
End: 2020-09-28

## 2020-09-28 NOTE — TELEPHONE ENCOUNTER
Will need an office visit to discuss  It is a little tricky weaning off of labetalol as well    I will probably suggest methyldopa, as nifedipine  Not my favorite blood pressure medication but we can discuss options at the appointment      For now she can take an extra 1/2 tablet of the 100 mg labetalol daily if her blood pressures high as needed until we see her    Thank you

## 2020-09-28 NOTE — TELEPHONE ENCOUNTER
----- Message from Osiris Louise sent at 2020  4:28 PM EDT -----  Regarding: FW: Non-Urgent Medical Question  Contact: [phone]    ----- Message -----  From: Shirin Hernandes  Sent: 2020   1:38 PM EDT  To: [facility]  Subject: Non-Urgent Medical Question                      Hello! Sorry I had to jump off our evisit but now I am wondering if I could just ask in email. My blood pressure has been creeping us post partum, and I am having some side effects from labetelol. I am pretty sure it’s not working well for me anymore. My blood pressure was 140/100 this morning manual. I was wondering if I could possibly try weaning off of labetelol and trying procardia? It is safe for breastfeeding and my ob actually prescribed it back when I had my first son. My  could monitor my blood pressure at home. Would this be a possibility? Given Covid and having a  I am trying my best to stay home. Thanks so very much!

## 2020-09-29 NOTE — TELEPHONE ENCOUNTER
Of course  Schedule appointment telehealth is fine as soon as possible  Just make sure we schedule her a time may be less busy in the afternoon  Last patient  Also since I am seeing patients in the office as well I rarely can be there at the exact time  Thank you

## 2020-09-29 NOTE — TELEPHONE ENCOUNTER
Can she do a telehealth visit?  She does not want to come into the office due to COVID she has a  baby. 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    Advise.

## 2020-09-30 ENCOUNTER — OFFICE VISIT (OUTPATIENT)
Dept: FAMILY MEDICINE CLINIC | Facility: CLINIC | Age: 35
End: 2020-09-30

## 2020-09-30 PROCEDURE — 99214 OFFICE O/P EST MOD 30 MIN: CPT | Performed by: NURSE PRACTITIONER

## 2020-09-30 RX ORDER — METHYLDOPA 250 MG/1
250 TABLET, FILM COATED ORAL 3 TIMES DAILY
Qty: 90 TABLET | Refills: 3 | Status: SHIPPED | OUTPATIENT
Start: 2020-09-30 | End: 2020-10-01

## 2020-09-30 NOTE — PROGRESS NOTES
Subjective   Shirin Hernandes is a 34 y.o. female.     Patient complains of elevated blood pressure, during her pregnancy, she required blood pressure medication.  Labetalol she did well with that her blood pressures controlled nicely postpartum that was continued and she has not had problems the medication she is presently breast-feeding not sure how long she had normal pregnancy no preeclampsia or other difficulties she is not pregnant and breast-feeding.  She is taking labetalol 200 mg in the last 2 doses she increased to 300 mg  She was taken 200 twice daily    She does not think it is controlling her blood pressure and previously she increase it to 300 and call some dizziness  She just order the [place] and get ready get back into shape she has no other problems and feels like she is doing well        200mg labetolol    Moved up to 300mg lightheaded dizziness   prev    138/100    140/100    preg htn  No preclamsia  No tobacco no alcohol or drug use         There were no vitals taken for this visit.      The following portions of the patient's history were reviewed and updated as appropriate: allergies, current medications, past family history, past medical history, past social history, past surgical history and problem list.    Review of Systems   Constitutional: Negative for chills, fatigue, fever and unexpected weight loss.   HENT: Negative.  Negative for trouble swallowing.    Eyes: Negative.    Respiratory: Negative for cough and shortness of breath.    Cardiovascular: Negative for chest pain, palpitations and leg swelling.   Gastrointestinal: Negative for abdominal pain and blood in stool.   Genitourinary: Negative.  Negative for pelvic pain.   Musculoskeletal: Negative.    Skin: Negative.    Neurological: Negative.  Negative for dizziness, speech difficulty, weakness and confusion.   Psychiatric/Behavioral: Negative.        Objective   Physical Exam  Constitutional:       Comments: Voice projects health    Pulmonary:      Effort: Pulmonary effort is normal.      Comments: Unlabored able to complete full sentences without dyspnea  Neurological:      Mental Status: She is alert.   Psychiatric:         Mood and Affect: Mood normal.         Behavior: Behavior normal.         Thought Content: Thought content normal.         Judgment: Judgment normal.           Assessment/Plan   Diagnoses and all orders for this visit:    Benign essential hypertension, postpartum    Other orders  -     methyldopa (ALDOMET) 250 MG tablet; Take 1 tablet by mouth 3 (Three) Times a Day. 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

## 2020-09-30 NOTE — PATIENT INSTRUCTIONS
Discharge instructions    Dietary changes and exercise changes as discussed    Decrease labetalol  Hold tonight's dose  Then tomorrow decrease labetalol to 100 mg twice a day x3 days  Then 50 mg twice a day x2 days  Then DC    Methyldopa 250 mg 1/2 tablet tonight  Then starting tomorrow 1/2 tablet 3 times a day  After labetalol discontinued increase methyldopa to 250 mg 3 times a day  Call me next week your blood pressure readings    I really do not get too many complaints with methyldopa  But that said since it works with the alpha receptors in the brain  Small potential for depression or fatigue if that is the case  Let me know if he have any issues here  Thanks!

## 2020-10-01 RX ORDER — METHYLDOPA 250 MG/1
250 TABLET, FILM COATED ORAL 3 TIMES DAILY
Qty: 90 TABLET | Refills: 3 | Status: CANCELLED | OUTPATIENT
Start: 2020-10-01

## 2020-10-01 RX ORDER — METHYLDOPA 250 MG/1
250 TABLET, FILM COATED ORAL 3 TIMES DAILY
Qty: 270 TABLET | Refills: 1 | Status: SHIPPED | OUTPATIENT
Start: 2020-10-01 | End: 2021-01-27

## 2020-10-01 NOTE — TELEPHONE ENCOUNTER
Can we check another pharmacy please urgently  This is a common medication [facility] just not caring it thank you

## 2020-10-01 NOTE — TELEPHONE ENCOUNTER
PT CALLED STATING THE PHARMACY APRN EPLEY SENT TO [facility] YESTERDAY IS NOT CARRIED BY THAT PHARMACY.    SHE CAN HAVE IT SENT TO [facility] MAIL DELIVERY, BUT THAT WILL TAKE A FEW DAYS.    PLEASE ADVISE PT IF YOU WANT TO SEND AN ALTERNATIVE IN TO THE LOCAL PHARMACY FOR HER TO START RIGHT AWAY, OR SEND THIS MEDICATION TO [facility] AND HAVE HER START IT WHEN IT ARRIVES.    CALLBACK NUMBER: [phone]

## 2020-10-05 RX ORDER — NIFEDIPINE 30 MG/1
30 TABLET, EXTENDED RELEASE ORAL DAILY
Qty: 30 TABLET | Refills: 5 | Status: SHIPPED | OUTPATIENT
Start: 2020-10-05 | End: 2020-10-14

## 2020-10-14 ENCOUNTER — TELEPHONE (OUTPATIENT)
Dept: LACTATION | Facility: HOSPITAL | Age: 35
End: 2020-10-14

## 2020-10-14 RX ORDER — NIFEDIPINE 90 MG/1
90 TABLET, EXTENDED RELEASE ORAL DAILY
Qty: 30 TABLET | Refills: 5 | Status: SHIPPED | OUTPATIENT
Start: 2020-10-14 | End: 2020-11-06

## 2020-10-14 NOTE — TELEPHONE ENCOUNTER
Shirin called regarding her 3 month old becoming fussy and uninterested in latching for a short time during the day but otherwise is latching well and having plenty of wet and stool diapers. She has also been waking in the night and breast feeding. Discussed possible teething and growth spurt and she agrees. She has also spoke with her pediatrician.

## 2020-11-06 ENCOUNTER — TELEPHONE (OUTPATIENT)
Dept: FAMILY MEDICINE CLINIC | Facility: CLINIC | Age: 35
End: 2020-11-06

## 2020-11-06 RX ORDER — NIFEDIPINE 60 MG/1
60 TABLET, FILM COATED, EXTENDED RELEASE ORAL DAILY
Qty: 90 TABLET | Refills: 1 | Status: SHIPPED | OUTPATIENT
Start: 2020-11-06 | End: 2021-01-27

## 2020-11-06 NOTE — TELEPHONE ENCOUNTER
PATIENT IS CALLING NEEDING A CALL BACK FROM DR. EPLEY    PATIENT STARTED A NEW MEDICATION   NIFEdipine XL (PROCARDIA XL) 90 MG 24 hr tablet    PATIENT IS HAVING HEART BURNING WITH THIS MEDICATION AND HER BLOOD PRESSURE HAS NOT CHANGE    PATIENT IS HAVING SOME OTHER SIDE EFFECTS FROM THE MEDICATION AND IS NEEDING A CALL BACK TO DISCUSS    PLEASE CONTACT PATIENT @[phone]

## 2020-11-06 NOTE — TELEPHONE ENCOUNTER
I gave her 60 mg nifedipine extended release  I sent that to mail order if she needs a local pharmacy  Letter sent her 30-day 60 mg daily    She can give me a couple blood pressure readings next week    It would be unlikely nifedipine causing chest issues    If she has any increased chest pain shortness of breath etc. the emergency room of course    We will see how she is doing next week with that and we can decide together at that time thank you

## 2020-11-06 NOTE — TELEPHONE ENCOUNTER
Discussed with patient  She does not feel good when she takes 90 mg nifedipine    She wants to know if she can take nifedipine and labetalol  During the transition she did the best with her blood pressure presently running 130s 190s  No chest pain shortness of breath or other issues  She would also like to continue nifedipine due to nipple spasms  She was told previously by lactation specialist this may help    She has had 90 mg nifedipine today and her blood pressure is uncontrolled she is without chest pain shortness of breath or severe complaints    50 mg labetalol tonight  Tomorrow she can take 50 mg labetalol and 30 mg nifedipine ER    She will take 30 mg nifedipine extended release daily for blood pressure  She will take 50 mg labetalol twice daily  She will check her blood pressure twice daily before her blood pressure ministration and hold for any hypotension  Sending some results around Tuesday  Any chest pain shortness of breath weakness syncope emergency room  It is okay to have blood pressure borderline elevated or moderately elevated  If there is any question to avoid severe hypotension which is what we want to avoid and should take extra precaution during this transition especially the synergistic effect of antihypertensives or inotropic effect bradycardia  Patient  Provided knowledgeable feedback

## 2020-11-06 NOTE — TELEPHONE ENCOUNTER
Called pt and upon speaking with her she states she is having random cramping in her chest after taking the procardia 90mg. She does states when weaning off labetalol and taking procardia at the same time she seemed to do better, she states she read you can take both of them at the same time. Pt would like your opinion on if you are willing to lower the dosage and start her back on labetalol. She would also like to know if you are not willing to start her back on labetalol and lower the dosage could you at least lower the dosage as she feels maybe it is to strong for her. She does state she has nipple spasms since breast feeding that the procardia has helped with.

## 2021-01-20 ENCOUNTER — TELEPHONE (OUTPATIENT)
Dept: GASTROENTEROLOGY | Facility: CLINIC | Age: 36
End: 2021-01-20

## 2021-01-27 ENCOUNTER — TELEPHONE (OUTPATIENT)
Dept: GASTROENTEROLOGY | Facility: CLINIC | Age: 36
End: 2021-01-27

## 2021-01-27 ENCOUNTER — OFFICE VISIT (OUTPATIENT)
Dept: GASTROENTEROLOGY | Facility: CLINIC | Age: 36
End: 2021-01-27

## 2021-01-27 VITALS — BODY MASS INDEX: 40.81 KG/M2 | HEIGHT: 67 IN | WEIGHT: 260 LBS

## 2021-01-27 DIAGNOSIS — K51.90 ULCERATIVE COLITIS WITHOUT COMPLICATIONS, UNSPECIFIED LOCATION (HCC): Primary | ICD-10-CM

## 2021-01-27 PROCEDURE — 99443 PR PHYS/QHP TELEPHONE EVALUATION 21-30 MIN: CPT | Performed by: NURSE PRACTITIONER

## 2021-01-27 RX ORDER — MELATONIN
1000 DAILY
Status: ON HOLD | COMMUNITY
End: 2022-03-01

## 2021-01-27 NOTE — PROGRESS NOTES
Chief Complaint   Patient presents with   • Ulcerative Colitis       Shirin Hernandes is a  35 y.o. female here for a telephone follow up visit for ulcerative colitis.    HPI  35-year-old female presents today for telephone follow-up visit for ulcerative colitis.  She is a patient of Dr. Acuna.  She was last seen in the office on 2019.  She is new to me today.You have chosen to receive care through a telephone visit. Do you consent to use a telephone visit for your medical care today? YES.    She has a history of ulcerative colitis dating back to 2 initial diagnosis in .  She admits she has been doing great since then on Lialda 2 tabs daily.  She has been pregnant twice and delivered 2 healthy babies and is now currently breast-feeding a 6-month-old.  She admits she had a slight flare with her first pregnancy but on the second 1 she is done great.  Her bowels are moving well.  She denies any dysphagia, reflux, abdominal pain, nausea and vomiting, diarrhea, constipation, rectal bleeding or melena.  She admits her appetite is good and her weight is stable.  Her last colonoscopy was on 2016.  Currently she is using a [facility] pharmacy to get her Lialda brand since it [place] and the generic just does not work well for her.      Past Medical History:   Diagnosis Date   • Anxiety    • GERD (gastroesophageal reflux disease)    • Headache    • Hx of migraines    • Hypertension     chronic; pt is on labetalol   • Ulcerative colitis (CMS/[facility])        Past Surgical History:   Procedure Laterality Date   •  SECTION  2015   •  SECTION N/A 2017    Procedure:  SECTION REPEAT;  Surgeon: Aron Valencia MD;  Location: [facility] LABOR DELIVERY;  Service:    •  SECTION WITH TUBAL N/A 2020    Procedure:  SECTION REPEAT WITH TUBAL;  Surgeon: Yudy Gupta MD;  Location: [facility] LABOR DELIVERY;  Service: Obstetrics/Gynecology;   Laterality: N/A;   • CHOLECYSTECTOMY WITH INTRAOPERATIVE CHOLANGIOGRAM N/A 8/26/2016    Procedure: CHOLECYSTECTOMY LAPAROSCOPIC WITH INTRAOPERATIVE CHOLANGIOGRAM AND LIVER BIOPSY ;  Surgeon: Dieudonne Lamb MD;  Location: [facility];  Service:    • COLONOSCOPY W/ BIOPSIES N/A 02/2016    Dr. Rom Wilks - Ulcerative Colitis per pt    • D&C CERVICAL BIOPSY  08/22/2014       Scheduled Meds:    Continuous Infusions:No current facility-administered medications for this visit.       PRN Meds:.    Allergies   Allergen Reactions   • Amoxicillin Other (See Comments)     As a Child   • Benzoyl Peroxide Hives       Social History     Socioeconomic History   • Marital status:      Spouse name: Not on file   • Number of children: Not on file   • Years of education: Not on file   • Highest education level: Not on file   Tobacco Use   • Smoking status: Former Smoker   • Smokeless tobacco: Never Used   • Tobacco comment: smoked as a teen    Substance and Sexual Activity   • Alcohol use: No   • Drug use: No   • Sexual activity: Yes     Partners: Male       Family History   Problem Relation Age of Onset   • Myocarditis Mother    • Hypertension Father    • Diabetes Maternal Uncle    • Aneurysm Paternal Aunt    • Pancreatic cancer Paternal Aunt    • Diabetes Maternal Grandmother    • Diabetes Maternal Grandfather    • Alcohol abuse Paternal Grandfather    • Diabetes Maternal Uncle        Review of Systems   Constitutional: Negative for appetite change, chills, diaphoresis, fatigue, fever and unexpected weight change.   HENT: Negative for nosebleeds, postnasal drip, sore throat, trouble swallowing and voice change.    Respiratory: Negative for cough, choking, chest tightness, shortness of breath and wheezing.    Cardiovascular: Negative for chest pain, palpitations and leg swelling.   Gastrointestinal: Negative for abdominal distention, abdominal pain, anal bleeding, blood in stool, constipation, diarrhea, nausea, rectal  pain and vomiting.   Endocrine: Negative for polydipsia, polyphagia and polyuria.   Musculoskeletal: Negative for gait problem.   Skin: Negative for rash and wound.   Allergic/Immunologic: Negative for food allergies.   Neurological: Negative for dizziness, speech difficulty and light-headedness.   Psychiatric/Behavioral: Negative for confusion, self-injury, sleep disturbance and suicidal ideas.       There were no vitals filed for this visit.    Physical Exam  Constitutional:       General: She is not in acute distress.  Neurological:      Mental Status: She is oriented to person, place, and time.   Psychiatric:         Mood and Affect: Mood normal.         Thought Content: Thought content normal.         Judgment: Judgment normal.         No radiology results for the last 7 days     Assessment and plan     1. Ulcerative colitis without complications, unspecified location (CMS/[facility])  - Case Request; Standing  - Case Request    Today's visit was done over the telephone.  Total time on the phone with the patient was 25 minutes.  Overall it sounds like she is doing very well.  Ulcerative colitis is well controlled on Lialda 2 tabs daily.  I will refill it for 1 year.  Bowels are moving well.  She is due for screening colonoscopy with Dr. Acuna.  I will go ahead and have the nurses schedule that with her today.  Patient is agreeable to the scope.  Patient to call the office with any issues.  Patient to follow-up with me in 1 year.  Patient is agreeable to the plan.

## 2021-01-27 NOTE — TELEPHONE ENCOUNTER
Called pt and advised that we have her script ready. Pt verb understanding and would like script mailed to her home address.  Script mailed.

## 2021-01-27 NOTE — TELEPHONE ENCOUNTER
----- Message from POLLO Pulido sent at 1/27/2021  2:35 PM EST -----  Patient needs a prescription for Lialda either faxed to her [facility] or mail to her home.  I will write it out and bring it to you.  The fax number is [phone] [place] [id number].

## 2021-01-27 NOTE — TELEPHONE ENCOUNTER
Per Marixa COYLE pt needs paper script for lialda to send to [facility]. Paper script at desk.     Called pt and left vm for pt to call back.     Script scanned under media tab.

## 2021-01-27 NOTE — TELEPHONE ENCOUNTER
spm for pt to return phone call to schedule procedure      with Dr. Acuna----- Message from POLLO Pulido sent at 1/27/2021  2:30 PM EST -----  Please call the patient and have her schedule a screening colonoscopy with Dr. Acuna later this year.  Thanks

## 2021-01-27 NOTE — TELEPHONE ENCOUNTER
----- Message from POLLO Pulido sent at 1/27/2021  2:30 PM EST -----  Please call the patient and have her schedule a screening colonoscopy with Dr. Acuna later this year.  Thanks

## 2021-01-29 ENCOUNTER — TELEPHONE (OUTPATIENT)
Dept: FAMILY MEDICINE CLINIC | Facility: CLINIC | Age: 36
End: 2021-01-29

## 2021-01-29 RX ORDER — PROPRANOLOL HYDROCHLORIDE 80 MG/1
80 CAPSULE, EXTENDED RELEASE ORAL DAILY
Qty: 30 CAPSULE | Refills: 1 | Status: SHIPPED | OUTPATIENT
Start: 2021-01-29 | End: 2021-04-08

## 2021-01-29 NOTE — TELEPHONE ENCOUNTER
----- Message from Dennies Garrick, MA sent at 1/28/2021  3:30 PM EST -----  Regarding: FW: Prescription Question  Contact: [phone]    ----- Message -----  From: Shirin Hernandes  Sent: 1/28/2021   1:58 PM EST  To: [facility]  Subject: Prescription Question                            Hello, wondering if I could possibly try propranolol for blood pressure vs labatelol which I am currently taking? Labetelol is causing hand tremors (I am sure of this as they stop when weaned and began immediately upon restarting med). I checked and propranolol is safe for breastfeeding and actually treats hand tremors so I figure I could ask. Thanks! (Please note I am ONLY taking labetelol I am NO longer taking nifedipine or any other bp med, thanks!)

## 2021-01-29 NOTE — TELEPHONE ENCOUNTER
I sent patient propanolol 80 mg extended release daily    She may need this twice a day or different dose but we probably should start low and go up as needed to avoid severe  Bradycardia during the transition from another beta-blocker.    She should discontinue the labetalol  Then the next morning check her blood pressure make sure it is okay and she can start the time release propanolol 80 mg daily this is extended release    She can take this medication daily 80 mg extended release propanolol for blood pressure    She can increase it to twice daily routinely if her blood pressure 150 or higher    Call me blood pressure readings or email in 1 week for adjustment    Thanks!!

## 2021-03-30 DIAGNOSIS — R00.2 PALPITATION: Primary | ICD-10-CM

## 2021-04-08 ENCOUNTER — TELEMEDICINE (OUTPATIENT)
Dept: FAMILY MEDICINE CLINIC | Facility: CLINIC | Age: 36
End: 2021-04-08

## 2021-04-08 ENCOUNTER — TELEPHONE (OUTPATIENT)
Dept: FAMILY MEDICINE CLINIC | Facility: CLINIC | Age: 36
End: 2021-04-08

## 2021-04-08 DIAGNOSIS — I10 ESSENTIAL HYPERTENSION: Primary | ICD-10-CM

## 2021-04-08 PROCEDURE — 99213 OFFICE O/P EST LOW 20 MIN: CPT | Performed by: NURSE PRACTITIONER

## 2021-04-08 RX ORDER — PROPRANOLOL HYDROCHLORIDE 120 MG/1
120 CAPSULE, EXTENDED RELEASE ORAL DAILY
Qty: 30 CAPSULE | Refills: 1 | Status: SHIPPED | OUTPATIENT
Start: 2021-04-08 | End: 2021-04-28 | Stop reason: SDUPTHER

## 2021-04-08 NOTE — PROGRESS NOTES
Chief Complaint  No chief complaint on file.    Subjective          Shirin Hernandes presents to [facility]  Follow-up essential hypertension, her trouble started with pregnancy without preeclampsia but she developed elevated blood pressure hypertension postpartum.  She is breast-feeding she wants to continue but we have had difficulty finding the right blood pressure regimen.  Blood pressure 130s over 100 range diastolic increased for several weeks, no chest pain no shortness of breath she has a appointment with cardiology prior history of palpitations none presently  Takes 80 mg of propanolol she has had no bradycardia problems from this heart rate running in the 70s  She thinks she can take 120 mg    Video visit with patient permission 20-minute      Objective   Vital Signs:   There were no vitals taken for this visit.    Physical Exam  Constitutional:       Appearance: Normal appearance.   Pulmonary:      Effort: Pulmonary effort is normal.   Skin:     General: Skin is warm and dry.   Neurological:      Mental Status: She is alert.   Psychiatric:         Mood and Affect: Mood normal.         Behavior: Behavior normal.        Result Review :                 Assessment and Plan    There are no diagnoses linked to this encounter.    Follow Up   No follow-ups on file.  Patient was given instructions and counseling regarding her condition or for health maintenance advice. Please see specific information pulled into the AVS if appropriate.     We will increase propanolol to 120 mg extended release she should do fine here she has had no problems with bradycardia and she has no asthma    She is already been referred to cardiology, patient request regarding palpitations she is having no fevers chest pains or increasing shortness of breath.

## 2021-04-08 NOTE — TELEPHONE ENCOUNTER
Caller: Shirin Hernandes    Relationship: Self    Best call back number: [phone]    What is the best time to reach you: ANY    Who are you requesting to speak with (clinical staff, provider,  specific staff member): JAMES EPLEY    Do you require a callback: YES-PATIENTS BLOOD PRESSURE WONT GO DOWN BELOW 100 AND SHE IS CONCERNED THAT HER MEDICATION NEEDS TO BE CHANGED. PLEASE CALL TO ADVISE.

## 2021-04-16 ENCOUNTER — BULK ORDERING (OUTPATIENT)
Dept: CASE MANAGEMENT | Facility: OTHER | Age: 36
End: 2021-04-16

## 2021-04-16 DIAGNOSIS — Z23 IMMUNIZATION DUE: ICD-10-CM

## 2021-04-27 ENCOUNTER — OFFICE VISIT (OUTPATIENT)
Dept: CARDIOLOGY | Facility: CLINIC | Age: 36
End: 2021-04-27

## 2021-04-27 VITALS
BODY MASS INDEX: 41.59 KG/M2 | HEART RATE: 77 BPM | DIASTOLIC BLOOD PRESSURE: 82 MMHG | HEIGHT: 67 IN | SYSTOLIC BLOOD PRESSURE: 128 MMHG | OXYGEN SATURATION: 99 % | WEIGHT: 265 LBS

## 2021-04-27 DIAGNOSIS — I10 ESSENTIAL HYPERTENSION: Primary | ICD-10-CM

## 2021-04-27 DIAGNOSIS — I42.9 FAMILIAL CARDIOMYOPATHY (HCC): ICD-10-CM

## 2021-04-27 DIAGNOSIS — R00.2 PALPITATIONS: ICD-10-CM

## 2021-04-27 PROCEDURE — 93000 ELECTROCARDIOGRAM COMPLETE: CPT | Performed by: INTERNAL MEDICINE

## 2021-04-27 PROCEDURE — 99204 OFFICE O/P NEW MOD 45 MIN: CPT | Performed by: INTERNAL MEDICINE

## 2021-04-27 RX ORDER — MULTIVIT-MIN/IRON/FOLIC ACID/K 18-600-40
1 CAPSULE ORAL DAILY
COMMUNITY

## 2021-04-27 NOTE — PROGRESS NOTES
CARDIOLOGY    Dania Camejo MD    ENCOUNTER DATE:  2021    Shirin Hernandes / 35 y.o. / female        CHIEF COMPLAINT / REASON FOR OFFICE VISIT     Palpitations (new patient) and Hypertension      HISTORY OF PRESENT ILLNESS       HPI    Shirin Hernandes is a 35 y.o. female     This is a nice lady who has had a history of hypertension with her first 2 pregnancies and remained on the medication after the baby was born but after she started exercising and lost weight she was able to come off medication.  Her third child was born about 10 months ago and her blood pressure has remained elevated.  During her pregnancy she was on labetalol but she had a tremor and had some lightheadedness with standing.  She tried some nifedipine and eventually ended on propranolol.  She decided to try to wean herself off of it but found that when she was exercising at high intensity that she would suddenly feel a palpitation and then her heart rate would not come down and would stay bouncing around in the 90s instead of going down to its usual 60s.  After it did it the first time, she went back on propranolol but it did it again while she was lifting weights so she has only been walking until she was able to get into see me.  Her mother  at 37 of cardiomyopathy assumed to be viral.          REVIEW OF SYSTEMS     Review of Systems   Constitutional: Negative for chills, fever, weight gain and weight loss.   Cardiovascular: Negative for leg swelling.   Respiratory: Negative for cough, snoring and wheezing.    Hematologic/Lymphatic: Negative for bleeding problem. Does not bruise/bleed easily.   Skin: Negative for color change.   Musculoskeletal: Negative for falls, joint pain and myalgias.   Gastrointestinal: Negative for melena.   Genitourinary: Negative for hematuria.   Neurological: Negative for excessive daytime sleepiness.   Psychiatric/Behavioral: Negative for depression. The patient is not nervous/anxious.           "        VITAL SIGNS     Visit Vitals  /82 (BP Location: Left arm)   Pulse 77   Ht 170.2 cm (67\")   Wt 120 kg (265 lb)   SpO2 99%   BMI 41.50 kg/m²         Wt Readings from Last 3 Encounters:   04/27/21 120 kg (265 lb)   01/27/21 118 kg (260 lb)   07/06/20 125 kg (276 lb)     Body mass index is 41.5 kg/m².      PHYSICAL EXAMINATION     Constitutional:       General: Not in acute distress.  Neck:      Vascular: No carotid bruit or JVD.   Pulmonary:      Effort: Pulmonary effort is normal.      Breath sounds: Normal breath sounds.   Cardiovascular:      Normal rate. Regular rhythm.      Murmurs: There is no murmur.   Psychiatric:         Mood and Affect: Mood and affect normal.           REVIEWED DATA       ECG 12 Lead    Date/Time: 4/27/2021 1:11 PM  Performed by: Dania Camejo MD  Authorized by: Dania Camejo MD   Previous ECG: no previous ECG available  Rhythm: sinus rhythm  BPM: 69  Conduction: conduction normal  ST Segments: ST segments normal  T Waves: T waves normal    Clinical impression: normal ECG                  Lab Results   Component Value Date    GLUCOSE 86 07/06/2020    BUN 14 07/06/2020    CREATININE 0.83 07/06/2020    EGFRIFNONA 78 07/06/2020    EGFRIFAFRI 81 10/10/2019    BCR 16.9 07/06/2020    K 3.9 07/06/2020    CO2 19.6 (L) 07/06/2020    CALCIUM 9.0 07/06/2020    PROTENTOTREF 7.1 10/10/2019    ALBUMIN 3.50 07/06/2020    LABIL2 1.4 10/10/2019    AST 18 07/06/2020    ALT 16 07/06/2020       ASSESSMENT & PLAN      Diagnosis Plan   1. Essential hypertension  Treadmill Stress Test    Adult Transthoracic Echo Complete W/ Cont if Necessary Per Protocol   2. Familial cardiomyopathy (CMS/HCC)  Treadmill Stress Test    Adult Transthoracic Echo Complete W/ Cont if Necessary Per Protocol   3. Palpitations  Treadmill Stress Test    Adult Transthoracic Echo Complete W/ Cont if Necessary Per Protocol       1.  Palpitations with tachycardia.  I am going to get her on the treadmill.  I want her to " take her propranolol like she normally would.  I would like to see what her heart rate and blood pressure do while she is really pushing herself.  I want to see if she has any arrhythmia.  I would suspect an atrial tachycardia.  I also want to make sure she is not getting hypertensive with exercise.  2.  Hypertension.  Continue propranolol.  3.  History of her mother dying at 37 of a cardiomyopathy.        Orders Placed This Encounter   Procedures   • Treadmill Stress Test     Standing Status:   Future     Standing Expiration Date:   4/27/2022     Order Specific Question:   Reason for exam?     Answer:   Arrhythmia     Order Specific Question:   Release to patient     Answer:   Immediate   • ECG 12 Lead     This order was created via procedure documentation     Order Specific Question:   Release to patient     Answer:   Immediate   • Adult Transthoracic Echo Complete W/ Cont if Necessary Per Protocol     Standing Status:   Future     Standing Expiration Date:   4/27/2022     Order Specific Question:   Reason for exam?     Answer:   Palpitations           MEDICATIONS         Discharge Medications          Accurate as of April 27, 2021  1:12 PM. If you have any questions, ask your nurse or doctor.            Changes to Medications      Instructions Start Date   Lialda 1.2 g EC tablet  Generic drug: mesalamine  What changed:   · how much to take  · when to take this   1.2 g, Oral, 4 Times Daily      SUMAtriptan 100 MG tablet  Commonly known as: Imitrex  What changed:   · how much to take  · how to take this  · when to take this  · reasons to take this   Take one tablet at onset of headache. May repeat dose one time in 2 hours if headache not relieved.         Continue These Medications      Instructions Start Date   cholecalciferol 25 MCG (1000 UT) tablet  Commonly known as: VITAMIN D3   1,000 Units, Oral, Daily      Multi For Her capsule   1 tablet, Oral, Daily      omeprazole 20 MG capsule  Commonly known as:  priLOSEC   20 mg, Oral, Daily      propranolol  MG 24 hr capsule  Commonly known as: INDERAL LA   120 mg, Oral, Daily, Or as instructed      ZINC PO   Oral         Stop These Medications    BIOTIN PO  Stopped by: Dania Camejo MD     prenatal (CLASSIC) vitamin  tablet  Generic drug: prenatal vitamin  Stopped by: MD Dania High MD  04/27/21  13:12 EDT    **Dragon Disclaimer:   Much of this encounter note is an electronic transcription/translation of spoken language to printed text. The electronic translation of spoken language may permit erroneous, or at times, nonsensical words or phrases to be inadvertently transcribed. Although I have reviewed the note for such errors, some may still exist.

## 2021-04-28 RX ORDER — PROPRANOLOL HYDROCHLORIDE 120 MG/1
120 CAPSULE, EXTENDED RELEASE ORAL DAILY
Qty: 90 CAPSULE | Refills: 1 | Status: SHIPPED | OUTPATIENT
Start: 2021-04-28 | End: 2021-10-22

## 2021-06-02 ENCOUNTER — HOSPITAL ENCOUNTER (OUTPATIENT)
Dept: CARDIOLOGY | Facility: HOSPITAL | Age: 36
Discharge: HOME | End: 2021-06-02

## 2021-06-02 VITALS
SYSTOLIC BLOOD PRESSURE: 148 MMHG | DIASTOLIC BLOOD PRESSURE: 96 MMHG | WEIGHT: 265 LBS | HEART RATE: 70 BPM | HEIGHT: 67 IN | BODY MASS INDEX: 41.59 KG/M2

## 2021-06-02 DIAGNOSIS — I10 ESSENTIAL HYPERTENSION: ICD-10-CM

## 2021-06-02 DIAGNOSIS — R00.2 PALPITATIONS: ICD-10-CM

## 2021-06-02 DIAGNOSIS — I42.9 FAMILIAL CARDIOMYOPATHY (HCC): ICD-10-CM

## 2021-06-02 LAB
BH CV STRESS BP STAGE 1: NORMAL
BH CV STRESS BP STAGE 2: NORMAL
BH CV STRESS DURATION MIN STAGE 1: 3
BH CV STRESS DURATION MIN STAGE 2: 3
BH CV STRESS DURATION MIN STAGE 3: 1
BH CV STRESS DURATION SEC STAGE 1: 0
BH CV STRESS DURATION SEC STAGE 2: 0
BH CV STRESS DURATION SEC STAGE 3: 30
BH CV STRESS GRADE STAGE 1: 10
BH CV STRESS GRADE STAGE 2: 12
BH CV STRESS GRADE STAGE 3: 14
BH CV STRESS HR STAGE 1: 101
BH CV STRESS HR STAGE 2: 118
BH CV STRESS HR STAGE 3: 128
BH CV STRESS METS STAGE 1: 5
BH CV STRESS METS STAGE 2: 7.5
BH CV STRESS METS STAGE 3: 9
BH CV STRESS PROTOCOL 1: NORMAL
BH CV STRESS RECOVERY BP: NORMAL MMHG
BH CV STRESS RECOVERY HR: 86 BPM
BH CV STRESS SPEED STAGE 1: 1.7
BH CV STRESS SPEED STAGE 2: 2.5
BH CV STRESS SPEED STAGE 3: 3.4
BH CV STRESS STAGE 1: 1
BH CV STRESS STAGE 2: 2
BH CV STRESS STAGE 3: 3
MAXIMAL PREDICTED HEART RATE: 183 BPM
PERCENT MAX PREDICTED HR: 69.95 %
STRESS BASELINE BP: NORMAL MMHG
STRESS BASELINE HR: 75 BPM
STRESS PERCENT HR: 82 %
STRESS POST ESTIMATED WORKLOAD: 9 METS
STRESS POST EXERCISE DUR MIN: 7 MIN
STRESS POST EXERCISE DUR SEC: 30 SEC
STRESS POST PEAK BP: NORMAL MMHG
STRESS POST PEAK HR: 128 BPM
STRESS TARGET HR: 156 BPM

## 2021-06-02 PROCEDURE — 93016 CV STRESS TEST SUPVJ ONLY: CPT | Performed by: INTERNAL MEDICINE

## 2021-06-02 PROCEDURE — 93356 MYOCRD STRAIN IMG SPCKL TRCK: CPT | Performed by: INTERNAL MEDICINE

## 2021-06-02 PROCEDURE — 93306 TTE W/DOPPLER COMPLETE: CPT

## 2021-06-02 PROCEDURE — 93017 CV STRESS TEST TRACING ONLY: CPT

## 2021-06-02 PROCEDURE — 93356 MYOCRD STRAIN IMG SPCKL TRCK: CPT

## 2021-06-02 PROCEDURE — 93018 CV STRESS TEST I&R ONLY: CPT | Performed by: INTERNAL MEDICINE

## 2021-06-02 PROCEDURE — 93306 TTE W/DOPPLER COMPLETE: CPT | Performed by: INTERNAL MEDICINE

## 2021-06-03 ENCOUNTER — TELEPHONE (OUTPATIENT)
Dept: CARDIOLOGY | Facility: CLINIC | Age: 36
End: 2021-06-03

## 2021-06-03 LAB
ASCENDING AORTA: 3.2 CM
BH CV ECHO MEAS - ACS: 2 CM
BH CV ECHO MEAS - AI DEC SLOPE: 154.6 CM/SEC^2
BH CV ECHO MEAS - AI MAX PG: 95.1 MMHG
BH CV ECHO MEAS - AI MAX VEL: 487.7 CM/SEC
BH CV ECHO MEAS - AI P1/2T: 924 MSEC
BH CV ECHO MEAS - AO MAX PG (FULL): 4.6 MMHG
BH CV ECHO MEAS - AO MAX PG: 10.6 MMHG
BH CV ECHO MEAS - AO MEAN PG (FULL): 1.4 MMHG
BH CV ECHO MEAS - AO MEAN PG: 5.1 MMHG
BH CV ECHO MEAS - AO ROOT AREA (BSA CORRECTED): 1.4
BH CV ECHO MEAS - AO ROOT AREA: 7.9 CM^2
BH CV ECHO MEAS - AO ROOT DIAM: 3.2 CM
BH CV ECHO MEAS - AO V2 MAX: 162.9 CM/SEC
BH CV ECHO MEAS - AO V2 MEAN: 102.7 CM/SEC
BH CV ECHO MEAS - AO V2 VTI: 38.7 CM
BH CV ECHO MEAS - ASC AORTA: 2.8 CM
BH CV ECHO MEAS - AVA(I,A): 2.3 CM^2
BH CV ECHO MEAS - AVA(I,D): 2.3 CM^2
BH CV ECHO MEAS - AVA(V,A): 2.4 CM^2
BH CV ECHO MEAS - AVA(V,D): 2.4 CM^2
BH CV ECHO MEAS - BSA(HAYCOCK): 2.4 M^2
BH CV ECHO MEAS - BSA: 2.3 M^2
BH CV ECHO MEAS - BZI_BMI: 41.5 KILOGRAMS/M^2
BH CV ECHO MEAS - BZI_METRIC_HEIGHT: 170.2 CM
BH CV ECHO MEAS - BZI_METRIC_WEIGHT: 120.2 KG
BH CV ECHO MEAS - EDV(MOD-SP2): 82 ML
BH CV ECHO MEAS - EDV(MOD-SP4): 92 ML
BH CV ECHO MEAS - EDV(TEICH): 124.2 ML
BH CV ECHO MEAS - EF(CUBED): 67.9 %
BH CV ECHO MEAS - EF(MOD-BP): 66 %
BH CV ECHO MEAS - EF(MOD-SP2): 62.2 %
BH CV ECHO MEAS - EF(MOD-SP4): 73.9 %
BH CV ECHO MEAS - EF(TEICH): 59.1 %
BH CV ECHO MEAS - ESV(MOD-SP2): 31 ML
BH CV ECHO MEAS - ESV(MOD-SP4): 24 ML
BH CV ECHO MEAS - ESV(TEICH): 50.8 ML
BH CV ECHO MEAS - FS: 31.5 %
BH CV ECHO MEAS - IVS/LVPW: 0.95
BH CV ECHO MEAS - IVSD: 0.84 CM
BH CV ECHO MEAS - LAT PEAK E' VEL: 10.8 CM/SEC
BH CV ECHO MEAS - LV DIASTOLIC VOL/BSA (35-75): 40.4 ML/M^2
BH CV ECHO MEAS - LV MASS(C)D: 154.2 GRAMS
BH CV ECHO MEAS - LV MASS(C)DI: 67.7 GRAMS/M^2
BH CV ECHO MEAS - LV MAX PG: 6.1 MMHG
BH CV ECHO MEAS - LV MEAN PG: 3.7 MMHG
BH CV ECHO MEAS - LV SYSTOLIC VOL/BSA (12-30): 10.5 ML/M^2
BH CV ECHO MEAS - LV V1 MAX: 123.1 CM/SEC
BH CV ECHO MEAS - LV V1 MEAN: 92.1 CM/SEC
BH CV ECHO MEAS - LV V1 VTI: 28.2 CM
BH CV ECHO MEAS - LVIDD: 5.1 CM
BH CV ECHO MEAS - LVIDS: 3.5 CM
BH CV ECHO MEAS - LVLD AP2: 6.8 CM
BH CV ECHO MEAS - LVLD AP4: 7.4 CM
BH CV ECHO MEAS - LVLS AP2: 5.9 CM
BH CV ECHO MEAS - LVLS AP4: 5.6 CM
BH CV ECHO MEAS - LVOT AREA (M): 3.1 CM^2
BH CV ECHO MEAS - LVOT AREA: 3.1 CM^2
BH CV ECHO MEAS - LVOT DIAM: 2 CM
BH CV ECHO MEAS - LVPWD: 0.88 CM
BH CV ECHO MEAS - MED PEAK E' VEL: 9.5 CM/SEC
BH CV ECHO MEAS - MR MAX PG: 11.9 MMHG
BH CV ECHO MEAS - MR MAX VEL: 172.4 CM/SEC
BH CV ECHO MEAS - MV A DUR: 0.14 SEC
BH CV ECHO MEAS - MV A MAX VEL: 49.8 CM/SEC
BH CV ECHO MEAS - MV DEC SLOPE: 479.9 CM/SEC^2
BH CV ECHO MEAS - MV DEC TIME: 0.16 SEC
BH CV ECHO MEAS - MV E MAX VEL: 72.2 CM/SEC
BH CV ECHO MEAS - MV E/A: 1.5
BH CV ECHO MEAS - MV MAX PG: 2.7 MMHG
BH CV ECHO MEAS - MV MEAN PG: 1.3 MMHG
BH CV ECHO MEAS - MV P1/2T MAX VEL: 83.4 CM/SEC
BH CV ECHO MEAS - MV P1/2T: 50.9 MSEC
BH CV ECHO MEAS - MV V2 MAX: 82.4 CM/SEC
BH CV ECHO MEAS - MV V2 MEAN: 52.9 CM/SEC
BH CV ECHO MEAS - MV V2 VTI: 21.6 CM
BH CV ECHO MEAS - MVA P1/2T LCG: 2.6 CM^2
BH CV ECHO MEAS - MVA(P1/2T): 4.3 CM^2
BH CV ECHO MEAS - MVA(VTI): 4.1 CM^2
BH CV ECHO MEAS - PA ACC TIME: 0.12 SEC
BH CV ECHO MEAS - PA MAX PG (FULL): 2.5 MMHG
BH CV ECHO MEAS - PA MAX PG: 4.6 MMHG
BH CV ECHO MEAS - PA PR(ACCEL): 26.7 MMHG
BH CV ECHO MEAS - PA V2 MAX: 107.2 CM/SEC
BH CV ECHO MEAS - PVA(V,A): 1.3 CM^2
BH CV ECHO MEAS - PVA(V,D): 1.3 CM^2
BH CV ECHO MEAS - QP/QS: 0.4
BH CV ECHO MEAS - RAP SYSTOLE: 3 MMHG
BH CV ECHO MEAS - RV MAX PG: 2.1 MMHG
BH CV ECHO MEAS - RV MEAN PG: 1.3 MMHG
BH CV ECHO MEAS - RV V1 MAX: 72.2 CM/SEC
BH CV ECHO MEAS - RV V1 MEAN: 54.5 CM/SEC
BH CV ECHO MEAS - RV V1 VTI: 17.8 CM
BH CV ECHO MEAS - RVOT AREA: 2 CM^2
BH CV ECHO MEAS - RVOT DIAM: 1.6 CM
BH CV ECHO MEAS - RVSP: 19.1 MMHG
BH CV ECHO MEAS - SI(AO): 134.2 ML/M^2
BH CV ECHO MEAS - SI(CUBED): 39.6 ML/M^2
BH CV ECHO MEAS - SI(LVOT): 38.9 ML/M^2
BH CV ECHO MEAS - SI(MOD-SP2): 22.4 ML/M^2
BH CV ECHO MEAS - SI(MOD-SP4): 29.8 ML/M^2
BH CV ECHO MEAS - SI(TEICH): 32.2 ML/M^2
BH CV ECHO MEAS - SV(AO): 305.9 ML
BH CV ECHO MEAS - SV(CUBED): 90.4 ML
BH CV ECHO MEAS - SV(LVOT): 88.6 ML
BH CV ECHO MEAS - SV(MOD-SP2): 51 ML
BH CV ECHO MEAS - SV(MOD-SP4): 68 ML
BH CV ECHO MEAS - SV(RVOT): 35.3 ML
BH CV ECHO MEAS - SV(TEICH): 73.4 ML
BH CV ECHO MEAS - TAPSE (>1.6): 2.2 CM
BH CV ECHO MEAS - TR MAX VEL: 200.9 CM/SEC
BH CV ECHO MEASUREMENTS AVERAGE E/E' RATIO: 7.11
BH CV XLRA - RV BASE: 3.1 CM
BH CV XLRA - RV LENGTH: 6.2 CM
BH CV XLRA - RV MID: 2.9 CM
BH CV XLRA - TDI S': 12.3 CM/SEC
LEFT ATRIUM VOLUME INDEX: 25 ML/M2
MAXIMAL PREDICTED HEART RATE: 183 BPM
SINUS: 2.8 CM
STJ: 2.9 CM
STRESS TARGET HR: 156 BPM

## 2021-06-03 NOTE — TELEPHONE ENCOUNTER
Please let her know that the stress test looked okay although we were not able to get her heart rate as fast as we would have liked.  But I am okay with that because as she and I discussed in the office, I wanted to see what her heart did while she was on the propranolol and exercising to make sure she was not having any abnormal heart rhythms.  Her blood pressure did great with exercise.  Her echo showed normal heart function and only some mild leaking of the aortic valve.  Overall benefits pretty good.  Definitely no signs of a weak heart muscle.  I am okay with her going back to exercising and doing what ever she feels comfortable with.  No further testing.

## 2021-06-03 NOTE — TELEPHONE ENCOUNTER
Patient notified of results and verbalized understanding  She wanted me to pass on that she has not had any further episodes now that she is on the increase dose of propranolol  Ginette Sierra RN  Triage nurse

## 2021-06-23 ENCOUNTER — TELEPHONE (OUTPATIENT)
Dept: GASTROENTEROLOGY | Facility: CLINIC | Age: 36
End: 2021-06-23

## 2021-06-23 NOTE — TELEPHONE ENCOUNTER
----- Message from Shirin Hernandes sent at 6/23/2021 10:58 AM EDT -----  Regarding: Non-Urgent Medical Question  Contact: [phone]  Yes I called office left message and no callback so I figured I would message. I have been having a mild flare since mid may. Meaning no blood but looser stools and pus/mucus in the stool. By increasing my lialda and changing diet I think it has mostly resolved but I am still having some mucus in my stool (which is no longer loose it’s back to normal). It is more than I had before, sometimes I will even pass a small amount of mucus without stool, usually in the evening when passing gas on the toilet. Is this normal after a flare? Never had a flare before. How long should I be on max dose of lialda? I am hoping to not have to take steroid. I am planning for a routine colonoscopy end of this year. Thanks!

## 2021-06-23 NOTE — TELEPHONE ENCOUNTER
Yes it sounds like she is doing everything right.  I would continue Lialda 4 tabs daily until she follows up with us in the office.  Yes it is normal to see mucus with or without stool when there is been constipation issues or inflammatory issues.  That should hopefully continue to improve.  If she does not continue to get better then we might need to consider a steroid Dosepak.  Have her call back next week with an update.  Thanks

## 2021-10-22 RX ORDER — PROPRANOLOL HYDROCHLORIDE 120 MG/1
120 CAPSULE, EXTENDED RELEASE ORAL DAILY
Qty: 90 CAPSULE | Refills: 0 | Status: SHIPPED | OUTPATIENT
Start: 2021-10-22 | End: 2021-12-22

## 2021-10-22 NOTE — TELEPHONE ENCOUNTER
PROTOCOLS NOT MET  Rx Refill Note  Requested Prescriptions     Pending Prescriptions Disp Refills   • propranolol LA (INDERAL LA) 120 MG 24 hr capsule [Pharmacy Med Name: PROPRANOLOL HCL  MG Capsule Extended Release 24 Hour] 90 capsule 1     Sig: TAKE 1 CAPSULE BY MOUTH DAILY OR AS INSTRUCTED      Last office visit with prescribing clinician: 9/30/2020      Next office visit with prescribing clinician: Visit date not found            Merry Valencia MA  10/22/21, 09:39 EDT

## 2021-11-16 ENCOUNTER — TELEPHONE (OUTPATIENT)
Dept: FAMILY MEDICINE CLINIC | Facility: CLINIC | Age: 36
End: 2021-11-16

## 2021-11-16 NOTE — TELEPHONE ENCOUNTER
Hub staff attempted to follow warm transfer process and was unsuccessful     Caller: Shirin Hernandes    Relationship to patient: Self    Best call back number: [phone]     Patient is needing: PATIENT ATTEMPTED TO RESCHEDULE LAB APPOINTMENT FOR CLOSER TO HER APPOINTMENT ON 12/08/21

## 2021-12-22 RX ORDER — PROPRANOLOL HYDROCHLORIDE 120 MG/1
CAPSULE, EXTENDED RELEASE ORAL
Qty: 90 CAPSULE | Refills: 0 | Status: SHIPPED | OUTPATIENT
Start: 2021-12-22 | End: 2022-03-08

## 2021-12-22 NOTE — TELEPHONE ENCOUNTER
Rx Refill Note  Requested Prescriptions     Pending Prescriptions Disp Refills   • propranolol LA (INDERAL LA) 120 MG 24 hr capsule [Pharmacy Med Name: PROPRANOLOL HCL  MG Capsule Extended Release 24 Hour] 90 capsule 0     Sig: TAKE 1 CAPSULE DAILY OR AS INSTRUCTED      Last office visit with prescribing clinician: 9/30/2020      Next office visit with prescribing clinician: 1/19/2022            Fauzia Leung MA  12/22/21, 16:02 EST

## 2021-12-30 ENCOUNTER — TELEPHONE (OUTPATIENT)
Dept: GASTROENTEROLOGY | Facility: CLINIC | Age: 36
End: 2021-12-30

## 2021-12-30 NOTE — TELEPHONE ENCOUNTER
Call to pt.   has been on maintenance lialda at 2 pills/day.  Does note occasional increase in mucous.  When this happens, increases to lialda 4 pills/day with resolution.       obtains lialda from [facility] because so expensive - still costs $300/bottle.  When has to increase to 4 pills/day, winds up going thru a bottle much quicker - thereby increasing expense.       has been on lialda since 2016.  Wonders if still effective for her, and if should try something else.      Currently breast feeding.      Upcoming appt with DR Acuna 2.16 - asking if should try something different before appt.      Amanda/[facility] sterling.  Update/question to Dr Acuna.

## 2021-12-30 NOTE — TELEPHONE ENCOUNTER
----- Message from Natan Ward sent at 12/30/2021 12:19 PM EST -----  Regarding: Medications  Contact: [phone]  Can send her something different ?, Faby.2 to expensive,  dosent feel its working.  [place] [id number] [facility]

## 2021-12-30 NOTE — TELEPHONE ENCOUNTER
Call to pt.  Advise per DR Acuna note. Verb understanding.     Message to pt via [employer] with equivalents.

## 2021-12-30 NOTE — TELEPHONE ENCOUNTER
"There are a number of meds that we could use: sulfasalazine, other mesalamine products, balsalazide, etc. Another issue is are these meds safe to take when you are breast feeding? Camille tells me \"caution advised while breastfeeding; low risk of infant diarrhea based on conflicting human data\".  I would have her find out from her insurance company which of the above meds for ulcerative colitis are \"preferred\" by her insurance company? I would then check to see if they \"preferred\" drugs are safe when breastfeeding? If so then we could try this new medicine. Thx. kjh  "

## 2022-01-07 ENCOUNTER — TELEPHONE (OUTPATIENT)
Dept: GASTROENTEROLOGY | Facility: CLINIC | Age: 37
End: 2022-01-07

## 2022-01-07 DIAGNOSIS — K51.90 ULCERATIVE COLITIS WITHOUT COMPLICATIONS, UNSPECIFIED LOCATION: Primary | ICD-10-CM

## 2022-01-07 NOTE — TELEPHONE ENCOUNTER
----- Message from Shirin Hernandes sent at 1/6/2022  4:00 PM EST -----  Regarding: lialda equivalents  Hi Chacha!   Let’s try the Balsalazide. I looked it up on [employer] and all mesalamine equivalent medications are safe for breastfeeding. And this one sounds much more affordable than lialda.   Can you please ask as to whether I start on a low dose? Can it be sent to the [facility] on [address]?     Also, I just received my covid booster and flu vaccine and once again I am having a lot of mucus. I may need to take a temporary steroid as discussed previously with ’s NP (I had the same issues with the first two covid vaccines). I will bump it up and take the full dose of lialda until I switch to the new medication. Hoping it works better than the lialda. I would hold off on switching medication until I am not flaring so much but I am running low on lialda and like I mentioned before it’s so pricey I would like to go ahead and switch drugs.   Thanks!

## 2022-01-07 NOTE — TELEPHONE ENCOUNTER
Call to pt.  Advise e-message will be directed to DR Acuna.  Verb understanding.     Pharmacy info updated.

## 2022-01-09 NOTE — TELEPHONE ENCOUNTER
I would recommend that we put her on the full dose of Balsalazide especially since she is having troubles that she considers a flair? Please send in a prescription for Balsalazide 750 mg 3 po TID (which is the full dose). Give her a 30 day supply with one year of refills.        Also I don't see any recent labs so I would like to get the following labs drawn:  - CBC, CMP, sed rate, C-reactive protein.       Make sure that she is going to come see me (or Ms. Kidd) in the next one month to make sure that she is tolerating the Balsalazide well? Thx.kjh

## 2022-01-10 RX ORDER — BALSALAZIDE DISODIUM 750 MG/1
2250 CAPSULE ORAL 3 TIMES DAILY
Qty: 270 CAPSULE | Refills: 11 | Status: SHIPPED | OUTPATIENT
Start: 2022-01-10 | End: 2022-01-13 | Stop reason: ALTCHOICE

## 2022-01-10 NOTE — TELEPHONE ENCOUNTER
Called pt and advised of Dr Acuna's note. Advised we will send the balsalazide to her [facility].        Pt verb understanding and is going to her pcp and will get the labs done there.  Lab orders placed and message sent to Dr Acuna to [facility].       Pt states she will call back to make appt.

## 2022-01-11 ENCOUNTER — TELEPHONE (OUTPATIENT)
Dept: GASTROENTEROLOGY | Facility: CLINIC | Age: 37
End: 2022-01-11

## 2022-01-11 NOTE — TELEPHONE ENCOUNTER
----- Message from Shirin Hernandes sent at 1/11/2022  8:50 AM EST -----  Regarding: Want to go back to lialda  Hi there, I wanted to send a message. I filled the Balsalazide last night and tried my first dose and I did NOT like it at all, I had flu like symptoms, insomnia, I felt shaky, it did not agree with me. I don’t want to take it again. Anyway, I have competed the costs and other alternatives are just as costly as lialda and would require 3 x a day va lialda once daily dosage..I fear I wouldn’t be able to take the generic to make it cheaper (just as I cannot take lialda generic, for some reason it doesn’t work as well on me). Anyway, I ordered more lialda and will take the max dose to get me out of this flare. Would dr Acuna think I need a one time medication of a steroid to bump me out of this little flare? It’s not terrible but I am still having some gas and mucus didn’t know if I needed a steroid to get me back to my baseline.     Thanks so much for everything.  Shirin Hernandes

## 2022-01-11 NOTE — TELEPHONE ENCOUNTER
Have her try the maximum dose of Lialda (4/day) for one week. If she is still having troubles then let me know and we could talk. Thx.kjh

## 2022-01-12 RX ORDER — MESALAMINE 1.2 G/1
1200 TABLET, DELAYED RELEASE ORAL 4 TIMES DAILY
Qty: 120 TABLET | Refills: 6 | OUTPATIENT
Start: 2022-01-12 | End: 2022-01-13 | Stop reason: SDUPTHER

## 2022-01-12 NOTE — TELEPHONE ENCOUNTER
Returned call to pt and advised of Dr Acuna's note. Pt verb understanding and states she needs a refill to go to [facility]   Refill of lialda sent .

## 2022-01-13 ENCOUNTER — TELEPHONE (OUTPATIENT)
Dept: GASTROENTEROLOGY | Facility: CLINIC | Age: 37
End: 2022-01-13

## 2022-01-13 LAB
ALBUMIN SERPL-MCNC: 4.2 G/DL (ref 3.8–4.8)
ALBUMIN/GLOB SERPL: 1.3 {RATIO} (ref 1.2–2.2)
ALP SERPL-CCNC: 111 IU/L (ref 44–121)
ALT SERPL-CCNC: 27 IU/L (ref 0–32)
AST SERPL-CCNC: 36 IU/L (ref 0–40)
BASOPHILS # BLD AUTO: 0.1 X10E3/UL (ref 0–0.2)
BASOPHILS NFR BLD AUTO: 1 %
BILIRUB SERPL-MCNC: 0.3 MG/DL (ref 0–1.2)
BUN SERPL-MCNC: 15 MG/DL (ref 6–20)
BUN/CREAT SERPL: 14 (ref 9–23)
CALCIUM SERPL-MCNC: 9.1 MG/DL (ref 8.7–10.2)
CHLORIDE SERPL-SCNC: 105 MMOL/L (ref 96–106)
CO2 SERPL-SCNC: 16 MMOL/L (ref 20–29)
CREAT SERPL-MCNC: 1.04 MG/DL (ref 0.57–1)
CRP SERPL-MCNC: 4 MG/L (ref 0–10)
EOSINOPHIL # BLD AUTO: 0.4 X10E3/UL (ref 0–0.4)
EOSINOPHIL NFR BLD AUTO: 5 %
ERYTHROCYTE [DISTWIDTH] IN BLOOD BY AUTOMATED COUNT: 12.7 % (ref 11.7–15.4)
ERYTHROCYTE [SEDIMENTATION RATE] IN BLOOD BY WESTERGREN METHOD: 13 MM/HR (ref 0–32)
GLOBULIN SER CALC-MCNC: 3.2 G/DL (ref 1.5–4.5)
GLUCOSE SERPL-MCNC: ABNORMAL MG/DL
HCT VFR BLD AUTO: 43.2 % (ref 34–46.6)
HGB BLD-MCNC: 14.8 G/DL (ref 11.1–15.9)
IMM GRANULOCYTES # BLD AUTO: 0 X10E3/UL (ref 0–0.1)
IMM GRANULOCYTES NFR BLD AUTO: 0 %
LYMPHOCYTES # BLD AUTO: 2.5 X10E3/UL (ref 0.7–3.1)
LYMPHOCYTES NFR BLD AUTO: 30 %
MCH RBC QN AUTO: 29.1 PG (ref 26.6–33)
MCHC RBC AUTO-ENTMCNC: 34.3 G/DL (ref 31.5–35.7)
MCV RBC AUTO: 85 FL (ref 79–97)
MONOCYTES # BLD AUTO: 0.4 X10E3/UL (ref 0.1–0.9)
MONOCYTES NFR BLD AUTO: 5 %
NEUTROPHILS # BLD AUTO: 4.9 X10E3/UL (ref 1.4–7)
NEUTROPHILS NFR BLD AUTO: 59 %
PLATELET # BLD AUTO: 324 X10E3/UL (ref 150–450)
POTASSIUM SERPL-SCNC: ABNORMAL MMOL/L
PROT SERPL-MCNC: 7.4 G/DL (ref 6–8.5)
RBC # BLD AUTO: 5.08 X10E6/UL (ref 3.77–5.28)
SODIUM SERPL-SCNC: 140 MMOL/L (ref 134–144)
WBC # BLD AUTO: 8.3 X10E3/UL (ref 3.4–10.8)

## 2022-01-13 RX ORDER — MESALAMINE 1.2 G/1
1200 TABLET, DELAYED RELEASE ORAL 4 TIMES DAILY
Qty: 120 TABLET | Refills: 6 | OUTPATIENT
Start: 2022-01-13 | End: 2022-01-13 | Stop reason: SDUPTHER

## 2022-01-13 RX ORDER — MESALAMINE 1.2 G/1
1200 TABLET, DELAYED RELEASE ORAL 4 TIMES DAILY
Qty: 120 TABLET | Refills: 6 | Status: SHIPPED | OUTPATIENT
Start: 2022-01-13 | End: 2022-03-03 | Stop reason: SDUPTHER

## 2022-01-13 NOTE — TELEPHONE ENCOUNTER
See rx of 1/12.     Call to [facility] verified.  Re-escribed,  Receipt confirmed.

## 2022-01-18 ENCOUNTER — TELEPHONE (OUTPATIENT)
Dept: GASTROENTEROLOGY | Facility: CLINIC | Age: 37
End: 2022-01-18

## 2022-01-18 NOTE — TELEPHONE ENCOUNTER
----- Message from Solitario Acuna MD sent at 1/18/2022  7:03 AM EST -----  01/18/22       Tell her that her labs show a normal CMP (except for a minimally elevated creatinine at 1.04). Her sed rate, C reactive protein, and CBC are all normal. Please send a copy to her PCP.   Kailey. kjh

## 2022-01-18 NOTE — TELEPHONE ENCOUNTER
Called pt and left  for pt to call back.     Results sent to J Epley NP thru [facility].

## 2022-01-18 NOTE — TELEPHONE ENCOUNTER
01/18/22       Tell her that her labs show a normal CMP (except for a minimally elevated creatinine at 1.04). Her sed rate, C reactive protein, and CBC are all normal. Please send a copy to her PCP.   Murali jj

## 2022-01-19 ENCOUNTER — TELEPHONE (OUTPATIENT)
Dept: GASTROENTEROLOGY | Facility: CLINIC | Age: 37
End: 2022-01-19

## 2022-01-19 ENCOUNTER — OFFICE VISIT (OUTPATIENT)
Dept: FAMILY MEDICINE CLINIC | Facility: CLINIC | Age: 37
End: 2022-01-19

## 2022-01-19 VITALS
SYSTOLIC BLOOD PRESSURE: 128 MMHG | WEIGHT: 270 LBS | TEMPERATURE: 96.6 F | HEIGHT: 67 IN | RESPIRATION RATE: 12 BRPM | DIASTOLIC BLOOD PRESSURE: 89 MMHG | OXYGEN SATURATION: 98 % | BODY MASS INDEX: 42.38 KG/M2 | HEART RATE: 81 BPM

## 2022-01-19 DIAGNOSIS — Z00.00 HEALTH MAINTENANCE EXAMINATION: Primary | ICD-10-CM

## 2022-01-19 DIAGNOSIS — I10 ESSENTIAL HYPERTENSION: ICD-10-CM

## 2022-01-19 DIAGNOSIS — R06.83 SNORING: ICD-10-CM

## 2022-01-19 DIAGNOSIS — R40.0 DAYTIME SOMNOLENCE: ICD-10-CM

## 2022-01-19 PROCEDURE — 99395 PREV VISIT EST AGE 18-39: CPT | Performed by: NURSE PRACTITIONER

## 2022-01-19 NOTE — PROGRESS NOTES
"Chief Complaint  Annual Exam (physical)    Subjective          Shirin Hernandes presents to [facility]  Very pleasant patient here today for complete physical exam health maintenance she is doing well overall, would like to try to do better and lose some weight, her blood pressures been controlled nicely the blocker approved for breast-feeding as shecontinues to breast-feed her toddler 18 months  Up-to-date with OB/GYN appointment she has no acute issues up-to-date with gastro  Ulcerative colitis stable.  Review of symptoms she snored while pregnant not so much now, she does have quite a bit of daytime somnolence  Not solely attributed to childcare and having a toddler      Objective   Vital Signs:   /89   Pulse 81   Temp 96.6 °F (35.9 °C) (Infrared)   Resp 12   Ht 170.2 cm (67\")   Wt 122 kg (270 lb)   SpO2 98%   BMI 42.29 kg/m²     Physical Exam  Vitals reviewed.   Constitutional:       Appearance: She is well-developed.   HENT:      Head: Normocephalic.      Nose: Nose normal.   Eyes:      General: No scleral icterus.     Conjunctiva/sclera: Conjunctivae normal.      Pupils: Pupils are equal, round, and reactive to light.   Neck:      Thyroid: No thyromegaly.      Vascular: No JVD.   Cardiovascular:      Rate and Rhythm: Normal rate and regular rhythm.      Heart sounds: Normal heart sounds. No murmur heard.  No friction rub. No gallop.    Pulmonary:      Effort: Pulmonary effort is normal. No respiratory distress.      Breath sounds: Normal breath sounds. No stridor. No wheezing or rales.   Abdominal:      General: Bowel sounds are normal. There is no distension.      Palpations: Abdomen is soft.      Tenderness: There is no abdominal tenderness.      Comments: No hepatosplenomegaly, no ascites,   Musculoskeletal:         General: No tenderness.      Cervical back: Neck supple.   Lymphadenopathy:      Cervical: No cervical adenopathy.   Skin:     General: Skin is warm " and dry.      Findings: No erythema or rash.   Neurological:      General: No focal deficit present.      Mental Status: She is alert and oriented to person, place, and time.      Deep Tendon Reflexes: Reflexes are normal and symmetric.   Psychiatric:         Behavior: Behavior normal.         Thought Content: Thought content normal.         Judgment: Judgment normal.        Result Review :                 Assessment and Plan    Diagnoses and all orders for this visit:    1. Health maintenance examination (Primary)    2. Snoring  -     Ambulatory Referral to Sleep Medicine    3. Daytime somnolence  -     Ambulatory Referral to Sleep Medicine    4. Essential hypertension  -     Basic Metabolic Panel; Future  -     Urinalysis With Microscopic If Indicated (No Culture) - Urine, Clean Catch; Future        Follow Up   Return in about 6 months (around 7/19/2022), or Fasting labs and recheck appointment 6 months, outpatient lab 6 weeks.  Patient was given instructions and counseling regarding her condition or for health maintenance advice. Please see specific information pulled into the AVS if appropriate.     Discharge instructions    Discharge instructions continue good choices  Encourage avoid fad diets  Weight watchers may assist  Encourage you eat to feel better to breathe better to walk better  Cut back across the board there is nothing you cannot have but typically much less breads and pastas and sweets  64 ounces of water a day very important for you vegetables vegetables vegetables and watch out for the additives that make these unhealthy  Such as excessive cream etc. You

## 2022-01-19 NOTE — PATIENT INSTRUCTIONS
Discharge instructions    Discharge instructions continue good choices  Encourage avoid fad diets  Weight watchers may assist  Encourage you eat to feel better to breathe better to walk better  Cut back across the board there is nothing you cannot have but typically much less breads and pastas and sweets  64 ounces of water a day very important for you vegetables vegetables vegetables and watch out for the additives that make these unhealthy  Such as excessive cream etc. You

## 2022-01-20 NOTE — TELEPHONE ENCOUNTER
Call to pt.  Advise per DR Acuna note.  Verb understanding.     States saw James Epley, APRN, yesterday.  Cr will be rechecked in 6 wks.

## 2022-02-14 ENCOUNTER — TELEPHONE (OUTPATIENT)
Dept: GASTROENTEROLOGY | Facility: CLINIC | Age: 37
End: 2022-02-14

## 2022-02-14 DIAGNOSIS — K51.90 ULCERATIVE COLITIS WITHOUT COMPLICATIONS, UNSPECIFIED LOCATION: Primary | ICD-10-CM

## 2022-02-14 DIAGNOSIS — R19.7 DIARRHEA, UNSPECIFIED TYPE: ICD-10-CM

## 2022-02-14 NOTE — TELEPHONE ENCOUNTER
----- Message from [facility] Anders sent at 2/14/2022  9:43 AM EST -----  Regarding: pt questions  Contact: [phone]  Pt is requesting a steroid. She states she think she's having a flare up

## 2022-02-14 NOTE — TELEPHONE ENCOUNTER
Called pt and pt reports that her uc began flaring after her covid booster in Jan.  Pt states that she then developed covid and now her flare has gotten worse.  Pt reports having 5-6 mucusy stools per day and is now starting to see blood.  Pt denies a fever and chills.  Pt is asking if she can have steroid sent to her pharmacy. Advised pt will send message to Dr Acuna and in the meantime if symptoms worsen advised to go to ER.  Verb understanding.     Pt does have upcoming appt with Dr Acuna on 02/23

## 2022-02-14 NOTE — TELEPHONE ENCOUNTER
She may be having a flair of UC but I would want to make sure that we get some stool studies first before putting her on prednisone. Have her come by the office to have the following stool studies done:  - clostridium dificile EIA, stool for O and P, fecal leukocytes, fecal calprotectin, culture       Have her f/u with me one week after she hands in the stool studies. Thx.kjh

## 2022-02-15 NOTE — TELEPHONE ENCOUNTER
Can someone come by our office to  these stool specimen containers, give them to the patient, then pick them up from her and drop them off at our office?

## 2022-02-15 NOTE — TELEPHONE ENCOUNTER
Called pt and advised of Dr Acuna's note.  Pt states she is still in quarantine for testing positive for covid.  She state she can not come out of her house.   Advised will update Dr Acuna.

## 2022-02-15 NOTE — TELEPHONE ENCOUNTER
Call to pt.  Advise per DR Acuna note.  Asking why stool samples needed.  Reiterate Dr Acuna's previous note  Verb understanding.     States will have family member  stool kit and return samples to office.     Orders placed for c diff, o&p, fecal leukocytes, fecal calprotectin, stool culture.  Message to DR Acuna.

## 2022-02-23 ENCOUNTER — OFFICE VISIT (OUTPATIENT)
Dept: GASTROENTEROLOGY | Facility: CLINIC | Age: 37
End: 2022-02-23

## 2022-02-23 ENCOUNTER — TELEPHONE (OUTPATIENT)
Dept: GASTROENTEROLOGY | Facility: CLINIC | Age: 37
End: 2022-02-23

## 2022-02-23 VITALS
TEMPERATURE: 92 F | HEIGHT: 67 IN | HEART RATE: 75 BPM | OXYGEN SATURATION: 97 % | SYSTOLIC BLOOD PRESSURE: 133 MMHG | WEIGHT: 273.4 LBS | BODY MASS INDEX: 42.91 KG/M2 | DIASTOLIC BLOOD PRESSURE: 92 MMHG

## 2022-02-23 DIAGNOSIS — K21.9 GASTROESOPHAGEAL REFLUX DISEASE, UNSPECIFIED WHETHER ESOPHAGITIS PRESENT: ICD-10-CM

## 2022-02-23 DIAGNOSIS — K51.90 ULCERATIVE COLITIS WITHOUT COMPLICATIONS, UNSPECIFIED LOCATION: Primary | ICD-10-CM

## 2022-02-23 PROCEDURE — 99214 OFFICE O/P EST MOD 30 MIN: CPT | Performed by: INTERNAL MEDICINE

## 2022-02-23 NOTE — TELEPHONE ENCOUNTER
spoke with pt in office scheduled at [facility] on march 1 arrive at 100 pm brant ramesh---[employer] instructional packet handed to pt in office

## 2022-02-23 NOTE — PROGRESS NOTES
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 She had her only colonoscopy by Dr. Wilks in  when she was diagnosed with UC. She c/o mucous in her stool since summer 2021 when she got the Covid vaccine. NO diarrhea but stool looser. 2 soft BM/day. No melena. Small amounts of bright red blood per rectum. She was boosted in  and it got worse. She got COVID  and is better now. On Lialda 4/day. NO abdominal or chest pain. No nausea or vomiting. No fevers, chills, night sweats. Her weight has continued to go up. 3 kids: 6, 4, and 19 mos. Her stool is flatter than it has been.     Past Medical History:   Diagnosis Date   • Anxiety    • GERD (gastroesophageal reflux disease)    • Headache    • Hx of migraines    • Hypertension     chronic; pt is on labetalol   • Ulcerative colitis (HCC)        Past Surgical History:   Procedure Laterality Date   •  SECTION  2015   •  SECTION N/A 2017    Procedure:  SECTION REPEAT;  Surgeon: Aron Valencia MD;  Location: [facility] LABOR DELIVERY;  Service:    •  SECTION WITH TUBAL N/A 2020    Procedure:  SECTION REPEAT WITH TUBAL;  Surgeon: Yudy Gupta MD;  Location: [facility] LABOR DELIVERY;  Service: Obstetrics/Gynecology;  Laterality: N/A;   • CHOLECYSTECTOMY WITH INTRAOPERATIVE CHOLANGIOGRAM N/A 2016    Procedure: CHOLECYSTECTOMY LAPAROSCOPIC WITH INTRAOPERATIVE CHOLANGIOGRAM AND LIVER BIOPSY ;  Surgeon: Dieudonne Lamb MD;  Location: [facility] OR [facility];  Service:    • COLONOSCOPY W/ BIOPSIES N/A 2016    Dr. Rom Wilks - Ulcerative Colitis per pt    • D & C CERVICAL BIOPSY  2014         Current Outpatient Medications:   •  cholecalciferol (VITAMIN D3) 25 MCG (1000 UT) tablet, Take 1,000 Units by mouth Daily., Disp: , Rfl:   •  Lialda 1.2 g EC tablet,  Take 1 tablet by mouth 4 (Four) Times a Day., Disp: 120 tablet, Rfl: 6  •  Multiple Vitamins-Minerals (Multi For Her) capsule, Take 1 tablet by mouth Daily., Disp: , Rfl:   •  omeprazole (priLOSEC) 20 MG capsule, Take 20 mg by mouth Daily., Disp: , Rfl:   •  propranolol LA (INDERAL LA) 120 MG 24 hr capsule, TAKE 1 CAPSULE DAILY OR AS INSTRUCTED, Disp: 90 capsule, Rfl: 0  •  SUMAtriptan (IMITREX) 100 MG tablet, Take one tablet at onset of headache. May repeat dose one time in 2 hours if headache not relieved. (Patient taking differently: Take 100 mg by mouth As Needed. Take one tablet at onset of headache. May repeat dose one time in 2 hours if headache not relieved.), Disp: 12 tablet, Rfl: 3  •  Multiple Vitamins-Minerals (ZINC PO), Take  by mouth., Disp: , Rfl:     Allergies   Allergen Reactions   • Amoxicillin Other (See Comments)     As a Child   • Benzoyl Peroxide Hives       Family History   Problem Relation Age of Onset   • Myocarditis Mother    • Hypertension Father    • Diabetes Maternal Uncle    • Aneurysm Paternal Aunt    • Pancreatic cancer Paternal Aunt    • Diabetes Maternal Grandmother    • Diabetes Maternal Grandfather    • Alcohol abuse Paternal Grandfather    • Diabetes Maternal Uncle        Social History     Socioeconomic History   • Marital status:    Tobacco Use   • Smoking status: Former Smoker   • Smokeless tobacco: Never Used   • Tobacco comment: smoked as a teen    Substance and Sexual Activity   • Alcohol use: No   • Drug use: No   • Sexual activity: Yes     Partners: Male       Review of Systems   Gastrointestinal: Negative for abdominal pain.   All other systems reviewed and are negative.    Pertinent positives and negatives documented in the HPI and all other systems reviewed and were found to be negative.  Vitals:    02/23/22 1328   BP: 133/92   Pulse: 75   Temp: 92 °F (33.3 °C)   SpO2: 97%       Physical Exam  Vitals reviewed.   Constitutional:       General: She is not in acute  distress.     Appearance: Normal appearance. She is well-developed. She is not diaphoretic.   HENT:      Head: Normocephalic and atraumatic. Hair is normal.      Right Ear: Hearing, tympanic membrane, ear canal and external ear normal. No decreased hearing noted. No drainage.      Left Ear: Hearing, tympanic membrane, ear canal and external ear normal. No decreased hearing noted.      Nose: Nose normal. No nasal deformity.      Mouth/Throat:      Mouth: Mucous membranes are moist.   Eyes:      General: Lids are normal.         Right eye: No discharge.         Left eye: No discharge.      Extraocular Movements: Extraocular movements intact.      Conjunctiva/sclera: Conjunctivae normal.      Pupils: Pupils are equal, round, and reactive to light.   Neck:      Thyroid: No thyromegaly.      Vascular: No JVD.      Trachea: No tracheal deviation.   Cardiovascular:      Rate and Rhythm: Normal rate and regular rhythm.      Pulses: Normal pulses.      Heart sounds: Normal heart sounds. No murmur heard.  No friction rub. No gallop.    Pulmonary:      Effort: Pulmonary effort is normal. No respiratory distress.      Breath sounds: Normal breath sounds. No wheezing or rales.   Chest:      Chest wall: No tenderness.   Abdominal:      General: Bowel sounds are normal. There is no distension.      Palpations: Abdomen is soft. There is no mass.      Tenderness: There is no abdominal tenderness. There is no guarding or rebound.      Hernia: No hernia is present.   Genitourinary:     Rectum: Normal. Guaiac result negative.   Musculoskeletal:         General: No tenderness or deformity. Normal range of motion.      Cervical back: Normal range of motion and neck supple.   Lymphadenopathy:      Cervical: No cervical adenopathy.   Skin:     General: Skin is warm and dry.      Findings: No erythema or rash.   Neurological:      Mental Status: She is alert and oriented to person, place, and time.      Cranial Nerves: No cranial nerve  deficit.      Motor: No abnormal muscle tone.      Coordination: Coordination normal.      Deep Tendon Reflexes: Reflexes are normal and symmetric. Reflexes normal.   Psychiatric:         Mood and Affect: Mood normal.         Behavior: Behavior normal.         Thought Content: Thought content normal.         Judgment: Judgment normal.         Diagnoses and all orders for this visit:    1. Ulcerative colitis without complications, unspecified location (HCC) (Primary)  -     Case Request; Standing  -     COVID PRE-OP / PRE-PROCEDURE SCREENING ORDER (NO ISOLATION) - Swab, Nasopharynx; Future  -     Case Request    2. Gastroesophageal reflux disease, unspecified whether esophagitis present  -     Case Request; Standing  -     COVID PRE-OP / PRE-PROCEDURE SCREENING ORDER (NO ISOLATION) - Swab, Nasopharynx; Future  -     Case Request    Other orders  -     Follow Anesthesia Guidelines / Standing Orders; Future  -     Obtain Informed Consent; Future      Assessment:  1. h/o ulcerative colitis diagnosed in 2016.  2. GERD - on Omeprazole 20 mg/day.  3. Obesity  4. Flatter stool.  5.     Recommendations:  1. Weight loss  2. EGD and colonoscopy to be done in the next 4 weeks.   3.     No follow-ups on file.    Solitario Acuna MD  2/23/2022

## 2022-02-24 ENCOUNTER — TELEPHONE (OUTPATIENT)
Dept: GASTROENTEROLOGY | Facility: CLINIC | Age: 37
End: 2022-02-24

## 2022-02-24 DIAGNOSIS — I10 ESSENTIAL HYPERTENSION: ICD-10-CM

## 2022-02-24 NOTE — TELEPHONE ENCOUNTER
Patient has laryngitis and only wants to do her colonoscopy at this time. Please addend order to reflect these changes

## 2022-02-24 NOTE — TELEPHONE ENCOUNTER
Pt is scheduled for colonoscopy and egd on 3-1-2022 with Dr. Acuna She only wants the colonoscopy.  She is fighting laryngitis and really does not want to do the egd at this time.   [phone]

## 2022-02-25 ENCOUNTER — TELEPHONE (OUTPATIENT)
Dept: GASTROENTEROLOGY | Facility: CLINIC | Age: 37
End: 2022-02-25

## 2022-02-25 ENCOUNTER — PREP FOR SURGERY (OUTPATIENT)
Dept: OTHER | Facility: HOSPITAL | Age: 37
End: 2022-02-25

## 2022-02-25 NOTE — TELEPHONE ENCOUNTER
Pt only wants to do colonoscopy.  No longer wants to do EGD..   She will no longer be getting the covid test.  Please take EGD off schedule  Leave colonoscopy on.  Thank you.  [phone]

## 2022-03-01 ENCOUNTER — ANESTHESIA (OUTPATIENT)
Dept: GASTROENTEROLOGY | Facility: HOSPITAL | Age: 37
End: 2022-03-01

## 2022-03-01 ENCOUNTER — ANESTHESIA EVENT (OUTPATIENT)
Dept: GASTROENTEROLOGY | Facility: HOSPITAL | Age: 37
End: 2022-03-01

## 2022-03-01 ENCOUNTER — HOSPITAL ENCOUNTER (OUTPATIENT)
Facility: HOSPITAL | Age: 37
Setting detail: HOSPITAL OUTPATIENT SURGERY
Discharge: HOME | End: 2022-03-01
Attending: INTERNAL MEDICINE | Admitting: INTERNAL MEDICINE

## 2022-03-01 VITALS
HEART RATE: 78 BPM | OXYGEN SATURATION: 98 % | DIASTOLIC BLOOD PRESSURE: 95 MMHG | HEIGHT: 67 IN | SYSTOLIC BLOOD PRESSURE: 125 MMHG | RESPIRATION RATE: 16 BRPM | BODY MASS INDEX: 42.33 KG/M2 | WEIGHT: 269.7 LBS

## 2022-03-01 DIAGNOSIS — K51.90 ULCERATIVE COLITIS WITHOUT COMPLICATIONS, UNSPECIFIED LOCATION: ICD-10-CM

## 2022-03-01 DIAGNOSIS — K21.9 GASTROESOPHAGEAL REFLUX DISEASE, UNSPECIFIED WHETHER ESOPHAGITIS PRESENT: ICD-10-CM

## 2022-03-01 LAB
B-HCG UR QL: NEGATIVE
EXPIRATION DATE: NORMAL
INTERNAL NEGATIVE CONTROL: NEGATIVE
INTERNAL POSITIVE CONTROL: POSITIVE
Lab: NORMAL

## 2022-03-01 PROCEDURE — 45380 COLONOSCOPY AND BIOPSY: CPT | Performed by: INTERNAL MEDICINE

## 2022-03-01 PROCEDURE — 81025 URINE PREGNANCY TEST: CPT | Performed by: INTERNAL MEDICINE

## 2022-03-01 PROCEDURE — 25010000002 PROPOFOL 10 MG/ML EMULSION: Performed by: ANESTHESIOLOGY

## 2022-03-01 PROCEDURE — 88305 TISSUE EXAM BY PATHOLOGIST: CPT | Performed by: INTERNAL MEDICINE

## 2022-03-01 RX ORDER — LIDOCAINE HYDROCHLORIDE 20 MG/ML
INJECTION, SOLUTION INFILTRATION; PERINEURAL AS NEEDED
Status: DISCONTINUED | OUTPATIENT
Start: 2022-03-01 | End: 2022-03-01 | Stop reason: SURG

## 2022-03-01 RX ORDER — SODIUM CHLORIDE, SODIUM LACTATE, POTASSIUM CHLORIDE, CALCIUM CHLORIDE 600; 310; 30; 20 MG/100ML; MG/100ML; MG/100ML; MG/100ML
30 INJECTION, SOLUTION INTRAVENOUS CONTINUOUS PRN
Status: DISCONTINUED | OUTPATIENT
Start: 2022-03-01 | End: 2022-03-01 | Stop reason: HOSPADM

## 2022-03-01 RX ORDER — SODIUM CHLORIDE 0.9 % (FLUSH) 0.9 %
3 SYRINGE (ML) INJECTION EVERY 12 HOURS SCHEDULED
Status: DISCONTINUED | OUTPATIENT
Start: 2022-03-01 | End: 2022-03-01 | Stop reason: HOSPADM

## 2022-03-01 RX ORDER — SODIUM CHLORIDE 0.9 % (FLUSH) 0.9 %
10 SYRINGE (ML) INJECTION AS NEEDED
Status: DISCONTINUED | OUTPATIENT
Start: 2022-03-01 | End: 2022-03-01 | Stop reason: HOSPADM

## 2022-03-01 RX ORDER — PROPOFOL 10 MG/ML
VIAL (ML) INTRAVENOUS CONTINUOUS PRN
Status: DISCONTINUED | OUTPATIENT
Start: 2022-03-01 | End: 2022-03-01 | Stop reason: SURG

## 2022-03-01 RX ADMIN — LIDOCAINE HYDROCHLORIDE 60 MG: 20 INJECTION, SOLUTION INFILTRATION; PERINEURAL at 13:04

## 2022-03-01 RX ADMIN — PROPOFOL 200 MCG/KG/MIN: 10 INJECTION, EMULSION INTRAVENOUS at 13:04

## 2022-03-01 NOTE — DISCHARGE INSTRUCTIONS

## 2022-03-01 NOTE — ANESTHESIA POSTPROCEDURE EVALUATION
"Patient: Shirin Hernandes    Procedure Summary     Date: 03/01/22 Room / Location:  [facility]    Anesthesia Start: 1303 Anesthesia Stop: 1335    Procedure: COLONOSCOPY to terminal ileum with polypectomy and biopsies (cold bx) (N/A ) Diagnosis:       Ulcerative colitis without complications, unspecified location (HCC)      Gastroesophageal reflux disease, unspecified whether esophagitis present      (Ulcerative colitis without complications, unspecified location (HCC) [K51.90])      (Gastroesophageal reflux disease, unspecified whether esophagitis present [K21.9])    Surgeons: Solitario Acuna MD Provider: Murtaza Kemp MD    Anesthesia Type: MAC ASA Status: 3          Anesthesia Type: MAC    Vitals  Vitals Value Taken Time   /95 03/01/22 1358   Temp     Pulse 78 03/01/22 1358   Resp 16 03/01/22 1358   SpO2 98 % 03/01/22 1358           Post Anesthesia Care and Evaluation    Patient location during evaluation: PACU  Patient participation: complete - patient participated  Level of consciousness: awake  Pain score: 0  Pain management: adequate  Airway patency: patent  Anesthetic complications: No anesthetic complications  PONV Status: none  Cardiovascular status: acceptable  Respiratory status: acceptable  Hydration status: acceptable    Comments: /95 (BP Location: Left arm, Patient Position: Lying)   Pulse 78   Resp 16   Ht 170.2 cm (67\")   Wt 122 kg (269 lb 11.2 oz)   SpO2 98%   BMI 42.24 kg/m²       "

## 2022-03-01 NOTE — H&P
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 She had her only colonoscopy by Dr. Wilks in  when she was diagnosed with UC. She c/o mucous in her stool since summer 2021 when she got the Covid vaccine. NO diarrhea but stool looser. 2 soft BM/day. No melena. Small amounts of bright red blood per rectum. She was boosted in  and it got worse. She got COVID  and is better now. On Lialda 4/day. NO abdominal or chest pain. No nausea or vomiting. No fevers, chills, night sweats. Her weight has continued to go up. 3 kids: 6, 4, and 19 mos. Her stool is flatter than it has been.      Medical History        Past Medical History:   Diagnosis Date   • Anxiety     • GERD (gastroesophageal reflux disease)     • Headache     • Hx of migraines     • Hypertension       chronic; pt is on labetalol   • Ulcerative colitis (HCC)              Surgical History         Past Surgical History:   Procedure Laterality Date   •  SECTION   2015   •  SECTION N/A 2017     Procedure:  SECTION REPEAT;  Surgeon: Aron Valencia MD;  Location: [facility] LABOR DELIVERY;  Service:    •  SECTION WITH TUBAL N/A 2020     Procedure:  SECTION REPEAT WITH TUBAL;  Surgeon: Yudy Gupta MD;  Location: [facility] LABOR DELIVERY;  Service: Obstetrics/Gynecology;  Laterality: N/A;   • CHOLECYSTECTOMY WITH INTRAOPERATIVE CHOLANGIOGRAM N/A 2016     Procedure: CHOLECYSTECTOMY LAPAROSCOPIC WITH INTRAOPERATIVE CHOLANGIOGRAM AND LIVER BIOPSY ;  Surgeon: Dieudonne Lamb MD;  Location: [facility] OR [facility] [place];  Service:    • COLONOSCOPY W/ BIOPSIES N/A 2016     Dr. Rom Wilks - Ulcerative Colitis per pt    • D & C CERVICAL BIOPSY   2014               Current Outpatient Medications:   •  cholecalciferol (VITAMIN D3) 25 MCG (1000 UT) tablet, Take  1,000 Units by mouth Daily., Disp: , Rfl:   •  Lialda 1.2 g EC tablet, Take 1 tablet by mouth 4 (Four) Times a Day., Disp: 120 tablet, Rfl: 6  •  Multiple Vitamins-Minerals (Multi For Her) capsule, Take 1 tablet by mouth Daily., Disp: , Rfl:   •  omeprazole (priLOSEC) 20 MG capsule, Take 20 mg by mouth Daily., Disp: , Rfl:   •  propranolol LA (INDERAL LA) 120 MG 24 hr capsule, TAKE 1 CAPSULE DAILY OR AS INSTRUCTED, Disp: 90 capsule, Rfl: 0  •  SUMAtriptan (IMITREX) 100 MG tablet, Take one tablet at onset of headache. May repeat dose one time in 2 hours if headache not relieved. (Patient taking differently: Take 100 mg by mouth As Needed. Take one tablet at onset of headache. May repeat dose one time in 2 hours if headache not relieved.), Disp: 12 tablet, Rfl: 3  •  Multiple Vitamins-Minerals (ZINC PO), Take  by mouth., Disp: , Rfl:            Allergies   Allergen Reactions   • Amoxicillin Other (See Comments)       As a Child   • Benzoyl Peroxide Hives               Family History   Problem Relation Age of Onset   • Myocarditis Mother     • Hypertension Father     • Diabetes Maternal Uncle     • Aneurysm Paternal Aunt     • Pancreatic cancer Paternal Aunt     • Diabetes Maternal Grandmother     • Diabetes Maternal Grandfather     • Alcohol abuse Paternal Grandfather     • Diabetes Maternal Uncle           Social History   Social History            Socioeconomic History   • Marital status:    Tobacco Use   • Smoking status: Former Smoker   • Smokeless tobacco: Never Used   • Tobacco comment: smoked as a teen    Substance and Sexual Activity   • Alcohol use: No   • Drug use: No   • Sexual activity: Yes       Partners: Male            Review of Systems   Gastrointestinal: Negative for abdominal pain.   All other systems reviewed and are negative.     Pertinent positives and negatives documented in the HPI and all other systems reviewed and were found to be negative.      Vitals:     02/23/22 1328   BP: 133/92    Pulse: 75   Temp: 92 °F (33.3 °C)   SpO2: 97%         Physical Exam  Vitals reviewed.   Constitutional:       General: She is not in acute distress.     Appearance: Normal appearance. She is well-developed. She is not diaphoretic.   HENT:      Head: Normocephalic and atraumatic. Hair is normal.      Right Ear: Hearing, tympanic membrane, ear canal and external ear normal. No decreased hearing noted. No drainage.      Left Ear: Hearing, tympanic membrane, ear canal and external ear normal. No decreased hearing noted.      Nose: Nose normal. No nasal deformity.      Mouth/Throat:      Mouth: Mucous membranes are moist.   Eyes:      General: Lids are normal.         Right eye: No discharge.         Left eye: No discharge.      Extraocular Movements: Extraocular movements intact.      Conjunctiva/sclera: Conjunctivae normal.      Pupils: Pupils are equal, round, and reactive to light.   Neck:      Thyroid: No thyromegaly.      Vascular: No JVD.      Trachea: No tracheal deviation.   Cardiovascular:      Rate and Rhythm: Normal rate and regular rhythm.      Pulses: Normal pulses.      Heart sounds: Normal heart sounds. No murmur heard.  No friction rub. No gallop.    Pulmonary:      Effort: Pulmonary effort is normal. No respiratory distress.      Breath sounds: Normal breath sounds. No wheezing or rales.   Chest:      Chest wall: No tenderness.   Abdominal:      General: Bowel sounds are normal. There is no distension.      Palpations: Abdomen is soft. There is no mass.      Tenderness: There is no abdominal tenderness. There is no guarding or rebound.      Hernia: No hernia is present.   Genitourinary:     Rectum: Normal. Guaiac result negative.   Musculoskeletal:         General: No tenderness or deformity. Normal range of motion.      Cervical back: Normal range of motion and neck supple.   Lymphadenopathy:      Cervical: No cervical adenopathy.   Skin:     General: Skin is warm and dry.      Findings: No erythema  or rash.   Neurological:      Mental Status: She is alert and oriented to person, place, and time.      Cranial Nerves: No cranial nerve deficit.      Motor: No abnormal muscle tone.      Coordination: Coordination normal.      Deep Tendon Reflexes: Reflexes are normal and symmetric. Reflexes normal.   Psychiatric:         Mood and Affect: Mood normal.         Behavior: Behavior normal.         Thought Content: Thought content normal.         Judgment: Judgment normal.            Diagnoses and all orders for this visit:     1. Ulcerative colitis without complications, unspecified location (HCC) (Primary)  -     Case Request; Standing  -     COVID PRE-OP / PRE-PROCEDURE SCREENING ORDER (NO ISOLATION) - Swab, Nasopharynx; Future  -     Case Request     2. Gastroesophageal reflux disease, unspecified whether esophagitis present  -     Case Request; Standing  -     COVID PRE-OP / PRE-PROCEDURE SCREENING ORDER (NO ISOLATION) - Swab, Nasopharynx; Future  -     Case Request     Other orders  -     Follow Anesthesia Guidelines / Standing Orders; Future  -     Obtain Informed Consent; Future        Assessment:  1. h/o ulcerative colitis diagnosed in 2016.  2. GERD - on Omeprazole 20 mg/day.  3. Obesity  4. Flatter stool.  5.      Recommendations:  1. Weight loss  2. EGD and colonoscopy to be done in the next 4 weeks.      3/1/22 - No change from the above H and P.   Solitario Acuna MD

## 2022-03-01 NOTE — ANESTHESIA PREPROCEDURE EVALUATION
Anesthesia Evaluation     Patient summary reviewed and Nursing notes reviewed                Airway   Mallampati: I  TM distance: >3 FB  Neck ROM: full  No difficulty expected  Dental - normal exam     Pulmonary - negative pulmonary ROS and normal exam   Cardiovascular - normal exam    (+) hypertension,       Neuro/Psych  (+) headaches, dizziness/light headedness, psychiatric history Anxiety,    GI/Hepatic/Renal/Endo    (+) obesity, morbid obesity, GERD, GI bleeding ,     Musculoskeletal (-) negative ROS    Abdominal  - normal exam    Bowel sounds: normal.   Substance History - negative use     OB/GYN negative ob/gyn ROS   (-)  Pregnant        Other                        Anesthesia Plan    ASA 3     MAC       Anesthetic plan, all risks, benefits, and alternatives have been provided, discussed and informed consent has been obtained with: patient.        CODE STATUS:

## 2022-03-02 LAB
LAB AP CASE REPORT: NORMAL
PATH REPORT.FINAL DX SPEC: NORMAL
PATH REPORT.GROSS SPEC: NORMAL

## 2022-03-03 ENCOUNTER — TELEPHONE (OUTPATIENT)
Dept: GASTROENTEROLOGY | Facility: CLINIC | Age: 37
End: 2022-03-03

## 2022-03-03 RX ORDER — MESALAMINE 1.2 G/1
TABLET, DELAYED RELEASE ORAL
Qty: 360 TABLET | Refills: 3 | Status: SHIPPED | OUTPATIENT
Start: 2022-03-03 | End: 2022-03-04 | Stop reason: CLARIF

## 2022-03-03 NOTE — TELEPHONE ENCOUNTER
I would make the prescription for Canasa Suppositories 1000 mg for 1 per rectum q HS with #30 and 11 refills. Thx.kj

## 2022-03-03 NOTE — TELEPHONE ENCOUNTER
----- Message from Shirin Hernandes sent at 3/3/2022 10:23 AM EST -----  Regarding: Send lialda refill to [facility] please  Hi! I was hoping you could please send a lialda refill request to [facility] for me please.     Thank you!     Shirin hernandes

## 2022-03-03 NOTE — TELEPHONE ENCOUNTER
----- Message from Solitario Acuna MD sent at 3/2/2022  7:02 PM EST -----  03/02/22       Tell her that pathology from her recent colonoscopy showed that the colon polyp that was removed was not cancerous and not precancerous, which is good.  The other colon biopsies showed normal terminal ileal biopsies.  The right colon biopsies showed mild to moderate chronic inflammation.  The rectal biopsy showed moderate chronic inflammation also.  The other colon biopsies came back normal.  I would continue taking the Lialda 4 pills a day and I would try the Canasa suppositories (q HS) to see if they help?  I would have her follow-up with Ms. Kidd or with me in the office in 8-ana weeks.  At that time we could talk about when she should have a repeat colonoscopy?       please send a copy of this report to her PCP.  Murali jj

## 2022-03-03 NOTE — TELEPHONE ENCOUNTER
Call to pt.  Advise per DR Acuna note.  Verb understanding.     States Canasa supp is for 30 days with no refill.  Asking if she has good response, should she request a refill?  Question to Dr Acuna.     F/u appt scheduled with JUDD Kidd for 5/11 @ 1pm..    Update to James Epley, APRN.

## 2022-03-03 NOTE — TELEPHONE ENCOUNTER
See path results of 3/3.    Escribe completed for lialda 1.2 gm - take 4 tabs by mouth daily, #360, R3.     Call to pt.  Advise of above.  Verb understanding.

## 2022-03-03 NOTE — PROGRESS NOTES
03/02/22       Tell her that pathology from her recent colonoscopy showed that the colon polyp that was removed was not cancerous and not precancerous, which is good.  The other colon biopsies showed normal terminal ileal biopsies.  The right colon biopsies showed mild to moderate chronic inflammation.  The rectal biopsy showed moderate chronic inflammation also.  The other colon biopsies came back normal.  I would continue taking the Lialda 4 pills a day and I would try the Canasa suppositories (q HS) to see if they help?  I would have her follow-up with Ms. Kidd or with me in the office in 8-ana weeks.  At that time we could talk about when she should have a repeat colonoscopy?       please send a copy of this report to her PCP.  Murali jj

## 2022-03-04 ENCOUNTER — TELEPHONE (OUTPATIENT)
Dept: GASTROENTEROLOGY | Facility: CLINIC | Age: 37
End: 2022-03-04

## 2022-03-04 RX ORDER — MESALAMINE 1.2 G/1
TABLET, DELAYED RELEASE ORAL
Qty: 360 TABLET | Refills: 3 | Status: SHIPPED | OUTPATIENT
Start: 2022-03-04

## 2022-03-04 NOTE — TELEPHONE ENCOUNTER
----- Message from Shirin Hernandes sent at 3/4/2022 10:22 AM EST -----  Regarding: Lialda prescription Needs update   Hi! I was checking my refill status, prescription of lialda from [facility] pharmacy and I am glad I checked, it was written in the generic form mesalamine tablet, but I need to have the name brand only, lialda, as for some reason the generic mesalamine does not work with me. Can dr bosch please resend my prescription to [facility] pharmacy and note Lialda, dispense as written or DONNY, no substitute please? I had them cancel the other one out so there wouldn’t be issues with insurance.  I have tired generic multiple times and for some reason it isn’t as effective. Thank you so much!

## 2022-03-04 NOTE — TELEPHONE ENCOUNTER
Call to pt.  Advise will send e-message to DR Acuna.      Advise that [employer] P[employer] may be option for obtaining lialda.  Forms sent to pt via [employer].     Reiterates that for canasa supp, does not want yr supply - would not want to take nightly.

## 2022-03-04 NOTE — TELEPHONE ENCOUNTER
William completed for Lialda 1.2 gm  - take 4 tabs by mouth daily, #360, R3, DONNY.     Call to pt  Advise of above.  Notify this office if any issues.  Verb understanding.

## 2022-03-04 NOTE — TELEPHONE ENCOUNTER
Tell her that what she says makes sense. Continue the Lialda and only take the Canasa suppositories q HS if she is flairing. Have her f/u with me in the office in a few months. анна

## 2022-03-04 NOTE — TELEPHONE ENCOUNTER
----- Message from Shirin Hernandes sent at 3/4/2022  9:09 AM EST -----  Regarding: Canasa supp  Hi just got a missed call from Chacha, I can never reach you guys from the phone so I thought I would message back online. She was saying dr bosch would like to write a script for Candasa supp a whole year. Please tell him I cannot and don’t want to do that. I have only been able to get two suppositories so far and they are very expensive. $8 each suppository and that’s with copay, that’s $240 out of pocket a month. On top of already extremely expensive lialda, (over 300 per month) I just cannot afford that. Especially given I feel my symptoms are improving by the day with lialda orally alone, like I explained to dr fuller my symptoms increase when I get sick like from a virus (cold or whichever). And I’m hoping with warmer weather my kids won’t have as many colds (this past winter hit us hard especially with covid so I think my flare had a lot to do with that). Please keep the 30 day script as it is, if I start having mucus again I will start the suppositories until the symptoms   subside, I just cannot do them as a  permanent secondary treatment, especially if I feel I am really improving on the lialda oral alone. If I was still having flare symptoms, I would be more willing to try it for few weeks or so, but definitely not a whole year. I cannot afford it nor would I want to do it for a year as my flares seem to be mild when compared to other patients with UC.  I hope you understand. :)   Thank you!

## 2022-03-07 ENCOUNTER — TELEPHONE (OUTPATIENT)
Dept: GASTROENTEROLOGY | Facility: CLINIC | Age: 37
End: 2022-03-07

## 2022-03-07 NOTE — TELEPHONE ENCOUNTER
----- Message from Shirin Hernandes sent at 3/7/2022 10:35 AM EST -----  Regarding: Lialda prescription Needs update   Jarett Burnham,   I checked [facility] again and sure enough it’s generic mesalamine again, can you please call and have them dispense lialda since you cannot note it on the website? Thank you!!

## 2022-03-07 NOTE — TELEPHONE ENCOUNTER
Called [facility] pharmacy and spoke with Alivia who advised that the brand name lialda shipped on today 03/07 to pt.     Called pt and advised of the above. Verb understanding.

## 2022-03-08 RX ORDER — PROPRANOLOL HYDROCHLORIDE 120 MG/1
CAPSULE, EXTENDED RELEASE ORAL
Qty: 90 CAPSULE | Refills: 0 | Status: SHIPPED | OUTPATIENT
Start: 2022-03-08 | End: 2022-10-12

## 2022-03-09 DIAGNOSIS — I10 ESSENTIAL HYPERTENSION: ICD-10-CM

## 2022-03-09 DIAGNOSIS — Z00.00 HEALTH MAINTENANCE EXAMINATION: ICD-10-CM

## 2022-03-10 LAB
ALBUMIN SERPL-MCNC: 4.4 G/DL (ref 3.8–4.8)
ALBUMIN/GLOB SERPL: 1.4 {RATIO} (ref 1.2–2.2)
ALP SERPL-CCNC: 109 IU/L (ref 44–121)
ALT SERPL-CCNC: 30 IU/L (ref 0–32)
APPEARANCE UR: CLEAR
AST SERPL-CCNC: 22 IU/L (ref 0–40)
BACTERIA #/AREA URNS HPF: NORMAL /[HPF]
BILIRUB SERPL-MCNC: 0.2 MG/DL (ref 0–1.2)
BILIRUB UR QL STRIP: NEGATIVE
BUN SERPL-MCNC: 14 MG/DL (ref 6–20)
BUN/CREAT SERPL: 15 (ref 9–23)
CALCIUM SERPL-MCNC: 9.7 MG/DL (ref 8.7–10.2)
CASTS URNS QL MICRO: NORMAL /LPF
CHLORIDE SERPL-SCNC: 102 MMOL/L (ref 96–106)
CO2 SERPL-SCNC: 19 MMOL/L (ref 20–29)
COLOR UR: YELLOW
CREAT SERPL-MCNC: 0.91 MG/DL (ref 0.57–1)
EGFR GENE MUT ANL BLD/T: 83 ML/MIN/1.73
EPI CELLS #/AREA URNS HPF: NORMAL /HPF (ref 0–10)
GLOBULIN SER CALC-MCNC: 3.1 G/DL (ref 1.5–4.5)
GLUCOSE SERPL-MCNC: 87 MG/DL (ref 65–99)
GLUCOSE UR QL STRIP: NEGATIVE
HBA1C MFR BLD: 5.3 % (ref 4.8–5.6)
HGB UR QL STRIP: NEGATIVE
KETONES UR QL STRIP: NEGATIVE
LEUKOCYTE ESTERASE UR QL STRIP: NEGATIVE
MICRO URNS: NORMAL
MICRO URNS: NORMAL
NITRITE UR QL STRIP: NEGATIVE
PH UR STRIP: 6.5 [PH] (ref 5–7.5)
POTASSIUM SERPL-SCNC: 4.2 MMOL/L (ref 3.5–5.2)
PROT SERPL-MCNC: 7.5 G/DL (ref 6–8.5)
PROT UR QL STRIP: NEGATIVE
RBC #/AREA URNS HPF: NORMAL /HPF (ref 0–2)
SODIUM SERPL-SCNC: 138 MMOL/L (ref 134–144)
SP GR UR STRIP: 1.01 (ref 1–1.03)
UNABLE TO VOID: NORMAL
UROBILINOGEN UR STRIP-MCNC: 0.2 MG/DL (ref 0.2–1)
WBC #/AREA URNS HPF: NORMAL /HPF (ref 0–5)

## 2022-03-10 RX ORDER — SUMATRIPTAN 100 MG/1
TABLET, FILM COATED ORAL
Qty: 12 TABLET | Refills: 3
Start: 2022-03-10 | End: 2022-03-22 | Stop reason: SDUPTHER

## 2022-03-10 NOTE — TELEPHONE ENCOUNTER
Rx Refill Note  Requested Prescriptions     Pending Prescriptions Disp Refills   • SUMAtriptan (IMITREX) 100 MG tablet 12 tablet 3     Sig: Take one tablet at onset of headache. May repeat dose one time in 2 hours if headache not relieved.      Last office visit with prescribing clinician: 1/19/2022      Next office visit with prescribing clinician: Visit date not found            Natan Conroy Rep  03/10/22, 10:09 EST

## 2022-03-22 RX ORDER — SUMATRIPTAN 100 MG/1
TABLET, FILM COATED ORAL
Qty: 12 TABLET | Refills: 3 | Status: SHIPPED | OUTPATIENT
Start: 2022-03-22

## 2022-05-18 ENCOUNTER — OFFICE VISIT (OUTPATIENT)
Dept: GASTROENTEROLOGY | Facility: CLINIC | Age: 37
End: 2022-05-18

## 2022-05-18 VITALS
HEIGHT: 67 IN | TEMPERATURE: 96.4 F | SYSTOLIC BLOOD PRESSURE: 138 MMHG | HEART RATE: 80 BPM | WEIGHT: 274 LBS | DIASTOLIC BLOOD PRESSURE: 95 MMHG | BODY MASS INDEX: 43 KG/M2

## 2022-05-18 DIAGNOSIS — R19.5 MUCUS IN STOOL: ICD-10-CM

## 2022-05-18 DIAGNOSIS — K21.9 GASTROESOPHAGEAL REFLUX DISEASE WITHOUT ESOPHAGITIS: ICD-10-CM

## 2022-05-18 DIAGNOSIS — Z51.81 THERAPEUTIC DRUG MONITORING: ICD-10-CM

## 2022-05-18 DIAGNOSIS — K63.5 HYPERPLASTIC COLONIC POLYP, UNSPECIFIED PART OF COLON: ICD-10-CM

## 2022-05-18 DIAGNOSIS — K51.30 ULCERATIVE RECTOSIGMOIDITIS WITHOUT COMPLICATION: Primary | ICD-10-CM

## 2022-05-18 PROCEDURE — 99214 OFFICE O/P EST MOD 30 MIN: CPT | Performed by: NURSE PRACTITIONER

## 2022-05-18 NOTE — PROGRESS NOTES
Chief Complaint   Patient presents with   • Ulcerative Colitis       Shirin Hernandes is a  36 y.o. female here for a follow up visit for ulcerative colitis.    HPI  36-year-old female presents today for follow-up visit for ulcerative colitis.  She is a patient of Dr. Acuna.  She was last seen in the office by him on 2/23/2022.  She underwent colonoscopy on 3/22.  It showed 1 hyperplastic colon polyp and right sided colon inflammation and rectal inflammation.  She was initially diagnosed with ulcerative colitis in 2016.  She has been taking Lialda 4 tabs daily.  She uses Canasa suppositories as needed.  She noticed that when she used the Canasa suppositories she would have terrible insomnia at night.  She looked this up and indeed she did find that people have complained of insomnia with Canasa.  So she has not been using it.  She admits ever since she had her COVID-vaccine last year and then subsequently ended up with COVID she has had flareup after flareup after flareup.  She tells me she is also been getting colds from her kids from school.  She tells me her immune system just has not been bouncing back like it used to.  A flare of UC for her entails lots of mucus in her stool to the point that she is afraid to pass gas because she is afraid to pass mucus.  She will have lower abdominal pain and cramping.  She tells me she is not having diarrhea but she is having 1-3 looser stools a day.  She also has a history of GERD and admits she does well on omeprazole 20 mg daily.  She denies any breakthrough reflux at this time.  He does have history of cholecystectomy.  She has a GI family history of pancreatic cancer with an aunt.  She tells me she is up-to-date with all of her vaccinations and health screenings.  She denies any dysphagia, reflux, nausea and vomiting, constipation, rectal bleeding or melena.  She admits her appetite is good and her weight is up 4 pounds since January of this year.  Past Medical History:    Diagnosis Date   • Anxiety    • GERD (gastroesophageal reflux disease)    • Headache    • Hx of migraines    • Hypertension     chronic; pt is on labetalol   • Ulcerative colitis (HCC)        Past Surgical History:   Procedure Laterality Date   •  SECTION  2015   •  SECTION N/A 2017    Procedure:  SECTION REPEAT;  Surgeon: Aron Valencia MD;  Location: [facility] LABOR DELIVERY;  Service:    •  SECTION WITH TUBAL N/A 2020    Procedure:  SECTION REPEAT WITH TUBAL;  Surgeon: Yudy Gupta MD;  Location: [facility] LABOR DELIVERY;  Service: Obstetrics/Gynecology;  Laterality: N/A;   • CHOLECYSTECTOMY WITH INTRAOPERATIVE CHOLANGIOGRAM N/A 2016    Procedure: CHOLECYSTECTOMY LAPAROSCOPIC WITH INTRAOPERATIVE CHOLANGIOGRAM AND LIVER BIOPSY ;  Surgeon: Dieudonne Lamb MD;  Location: [facility] OR [facility] [place];  Service:    • COLONOSCOPY N/A 3/1/2022    Procedure: COLONOSCOPY to terminal ileum with polypectomy and biopsies (cold bx);  Surgeon: Solitario Acuna MD;  Location: [facility] ENDOSCOPY;  Service: Gastroenterology;  Laterality: N/A;  pre - hx UC, rectal bleeding, flat stool  post - polyp, hemorrhoids   • COLONOSCOPY W/ BIOPSIES N/A 2016    Dr. Rom Wilks - Ulcerative Colitis per pt    • D & C CERVICAL BIOPSY  2014       Scheduled Meds:    Continuous Infusions:No current facility-administered medications for this visit.      PRN Meds:.    Allergies   Allergen Reactions   • Amoxicillin Other (See Comments)     As a Child   • Benzoyl Peroxide Hives       Social History     Socioeconomic History   • Marital status:    Tobacco Use   • Smoking status: Former Smoker     Quit date:      Years since quittin.3   • Smokeless tobacco: Never Used   • Tobacco comment: smoked as a teen    Substance and Sexual Activity   • Alcohol use: No   • Drug use: No   • Sexual activity: Yes     Partners: Male       Family History   Problem Relation Age of Onset   •  Myocarditis Mother    • Hypertension Father    • Diabetes Maternal Uncle    • Aneurysm Paternal Aunt    • Pancreatic cancer Paternal Aunt    • Diabetes Maternal Grandmother    • Diabetes Maternal Grandfather    • Alcohol abuse Paternal Grandfather    • Diabetes Maternal Uncle        Review of Systems   Constitutional: Negative for appetite change, chills, diaphoresis, fatigue, fever and unexpected weight change.   HENT: Negative for nosebleeds, postnasal drip, sore throat, trouble swallowing and voice change.    Respiratory: Negative for cough, choking, chest tightness, shortness of breath, wheezing and stridor.    Cardiovascular: Negative for chest pain, palpitations and leg swelling.   Gastrointestinal: Positive for abdominal distention and abdominal pain. Negative for anal bleeding, blood in stool, constipation, diarrhea, nausea, rectal pain and vomiting.   Endocrine: Negative for polydipsia, polyphagia and polyuria.   Musculoskeletal: Negative for gait problem.   Skin: Negative for rash and wound.   Allergic/Immunologic: Negative for food allergies.   Neurological: Negative for dizziness, speech difficulty and light-headedness.   Psychiatric/Behavioral: Negative for confusion, self-injury, sleep disturbance and suicidal ideas.       Vitals:    05/18/22 1336   BP: 138/95   Pulse: 80   Temp: 96.4 °F (35.8 °C)       Physical Exam  Constitutional:       General: She is not in acute distress.     Appearance: She is well-developed. She is not ill-appearing.   HENT:      Head: Normocephalic.   Eyes:      Pupils: Pupils are equal, round, and reactive to light.   Cardiovascular:      Rate and Rhythm: Normal rate and regular rhythm.      Heart sounds: Normal heart sounds.   Pulmonary:      Effort: Pulmonary effort is normal.      Breath sounds: Normal breath sounds.   Abdominal:      General: Bowel sounds are normal. There is distension.      Palpations: Abdomen is soft. There is no mass.      Tenderness: There is no  abdominal tenderness. There is no guarding or rebound.      Hernia: No hernia is present.   Musculoskeletal:         General: Normal range of motion.   Skin:     General: Skin is warm and dry.   Neurological:      Mental Status: She is alert and oriented to person, place, and time.   Psychiatric:         Speech: Speech normal.         Behavior: Behavior normal.         Judgment: Judgment normal.         No radiology results for the last 7 days     Diagnoses and all orders for this visit:    1. Ulcerative rectosigmoiditis without complication (HCC) (Primary)  -     CBC & Differential  -     Comprehensive Metabolic Panel  -     Sedimentation Rate  -     C-reactive Protein  -     TSH  -     Vitamin D 25 Hydroxy    2. Therapeutic drug monitoring  -     CBC & Differential  -     Comprehensive Metabolic Panel  -     Sedimentation Rate  -     C-reactive Protein  -     TSH  -     Vitamin D 25 Hydroxy    3. Gastroesophageal reflux disease without esophagitis  Overview:  Added automatically from request for surgery [id number]      4. Mucus in stool    5. Hyperplastic colonic polyp, unspecified part of colon     Reviewed most recent labs and colonoscopy findings with her today.  Sounds like her ulcerative colitis is acting up.  Recommend she continue the Lialda 4 tabs daily.  Since the Canasa is causing insomnia I recommend she take it in the morning.  Continue a bland diet.  We will check some routine labs today.  GERD seems well controlled on omeprazole 20 mg daily.  We did discuss the long-term potential side effects of taking a PPI for many years.  Would recommend she taper off of omeprazole onto Pepcid when possible.  She can do that trial once she is feeling better with her UC.  Continue GERD precautions.  Patient to call the office next week with an update.  Patient follow-up with me in 3 weeks.  Patient is agreeable to the plan.

## 2022-05-19 ENCOUNTER — TELEPHONE (OUTPATIENT)
Dept: GASTROENTEROLOGY | Facility: CLINIC | Age: 37
End: 2022-05-19

## 2022-05-19 LAB
25(OH)D3+25(OH)D2 SERPL-MCNC: 33.8 NG/ML (ref 30–100)
ALBUMIN SERPL-MCNC: 4.2 G/DL (ref 3.8–4.8)
ALBUMIN/GLOB SERPL: 1.4 {RATIO} (ref 1.2–2.2)
ALP SERPL-CCNC: 97 IU/L (ref 44–121)
ALT SERPL-CCNC: 25 IU/L (ref 0–32)
AST SERPL-CCNC: 21 IU/L (ref 0–40)
BASOPHILS # BLD AUTO: 0 X10E3/UL (ref 0–0.2)
BASOPHILS NFR BLD AUTO: 1 %
BILIRUB SERPL-MCNC: 0.4 MG/DL (ref 0–1.2)
BUN SERPL-MCNC: 16 MG/DL (ref 6–20)
BUN/CREAT SERPL: 15 (ref 9–23)
CALCIUM SERPL-MCNC: 9 MG/DL (ref 8.7–10.2)
CHLORIDE SERPL-SCNC: 103 MMOL/L (ref 96–106)
CO2 SERPL-SCNC: 22 MMOL/L (ref 20–29)
CREAT SERPL-MCNC: 1.08 MG/DL (ref 0.57–1)
CRP SERPL-MCNC: 4 MG/L (ref 0–10)
EGFRCR SERPLBLD CKD-EPI 2021: 67 ML/MIN/1.73
EOSINOPHIL # BLD AUTO: 0.6 X10E3/UL (ref 0–0.4)
EOSINOPHIL NFR BLD AUTO: 8 %
ERYTHROCYTE [DISTWIDTH] IN BLOOD BY AUTOMATED COUNT: 13 % (ref 11.7–15.4)
ERYTHROCYTE [SEDIMENTATION RATE] IN BLOOD BY WESTERGREN METHOD: 5 MM/HR (ref 0–32)
GLOBULIN SER CALC-MCNC: 2.9 G/DL (ref 1.5–4.5)
GLUCOSE SERPL-MCNC: 98 MG/DL (ref 65–99)
HCT VFR BLD AUTO: 44.9 % (ref 34–46.6)
HGB BLD-MCNC: 14.7 G/DL (ref 11.1–15.9)
IMM GRANULOCYTES # BLD AUTO: 0 X10E3/UL (ref 0–0.1)
IMM GRANULOCYTES NFR BLD AUTO: 0 %
LYMPHOCYTES # BLD AUTO: 2.3 X10E3/UL (ref 0.7–3.1)
LYMPHOCYTES NFR BLD AUTO: 29 %
MCH RBC QN AUTO: 28.5 PG (ref 26.6–33)
MCHC RBC AUTO-ENTMCNC: 32.7 G/DL (ref 31.5–35.7)
MCV RBC AUTO: 87 FL (ref 79–97)
MONOCYTES # BLD AUTO: 0.5 X10E3/UL (ref 0.1–0.9)
MONOCYTES NFR BLD AUTO: 6 %
NEUTROPHILS # BLD AUTO: 4.6 X10E3/UL (ref 1.4–7)
NEUTROPHILS NFR BLD AUTO: 56 %
PLATELET # BLD AUTO: 291 X10E3/UL (ref 150–450)
POTASSIUM SERPL-SCNC: 4.7 MMOL/L (ref 3.5–5.2)
PROT SERPL-MCNC: 7.1 G/DL (ref 6–8.5)
RBC # BLD AUTO: 5.16 X10E6/UL (ref 3.77–5.28)
SODIUM SERPL-SCNC: 141 MMOL/L (ref 134–144)
TSH SERPL DL<=0.005 MIU/L-ACNC: 0.96 UIU/ML (ref 0.45–4.5)
WBC # BLD AUTO: 8 X10E3/UL (ref 3.4–10.8)

## 2022-05-19 NOTE — TELEPHONE ENCOUNTER
----- Message from POLLO Pulido sent at 5/19/2022  9:13 AM EDT -----  Please call the patient and let her know her labs look good.

## 2022-05-24 ENCOUNTER — TELEPHONE (OUTPATIENT)
Dept: GASTROENTEROLOGY | Facility: CLINIC | Age: 37
End: 2022-05-24

## 2022-05-24 NOTE — TELEPHONE ENCOUNTER
Okay to stop the suppositories if you are doing better.  Please let me know if you get worse again.  See you at follow-up.  Thanks

## 2022-05-24 NOTE — TELEPHONE ENCOUNTER
----- Message from Shirin Hernandes sent at 5/24/2022  9:22 AM EDT -----  Regarding: Question mesalamine supp  Hi Marixa, I’m thinking I’m going to stop taking the suppositories? I’ve been taking them just about every day and started noticing lower back pain (of course not 100% sure if it’s caused by the supp), and my creatinine is elevated, and the lab test was before I started using the supp!) also I  have had to use the occasional Aleve for a headache not relieved with Tylenol. I’m worried about my kidneys with this extra dose and max dose of lialda. And I forgot to mention I am still breastfeeding, it’s never affected any of my babies but I worry with the extra mesalamine (although hoping it’s low risk as it’s a supp which is absorbed by colon). I can keep my follow up with you in 3 weeks if you would like. My symptoms do seem to be improving, just wanted to get your thoughts. Thanks!

## 2022-06-30 NOTE — TELEPHONE ENCOUNTER
----- Message from Shirin Hernandes sent at 1/13/2022  8:46 AM EST -----  Regarding: Need lialda script asap please  Hi! I spoke with Chacha yesterday about getting lialda sent to local [facility] as I was out of medication (I thought I was going to switch and could not locate any more back up so I was out). It was supposed to be faxed and when I called [facility] they had not received any script for lialda. Can this please be sent immediately so I can take my medicine? I called yesterday it nobody answered. Remember to have dr miranda not to substitute with generic. Thanks!    [Transvaginal OB Sonogram] : Transvaginal OB Sonogram [Intrauterine Pregnancy] : intrauterine pregnancy [Yolk Sac] : yolk sac present [FreeTextEntry1] : CRL measuring 5w5d, no fh, didelphys uterus c/w missed

## 2022-09-12 RX ORDER — PROPRANOLOL HYDROCHLORIDE 120 MG/1
CAPSULE, EXTENDED RELEASE ORAL
Qty: 90 CAPSULE | Refills: 0 | OUTPATIENT
Start: 2022-09-12

## 2022-09-12 RX ORDER — PROPRANOLOL HYDROCHLORIDE 80 MG/1
80 CAPSULE, EXTENDED RELEASE ORAL DAILY
Qty: 90 CAPSULE | Refills: 1 | Status: SHIPPED | OUTPATIENT
Start: 2022-09-12 | End: 2022-10-12 | Stop reason: SDUPTHER

## 2022-09-12 NOTE — TELEPHONE ENCOUNTER
Caller: Shirin Hernandes    Relationship: Self    Best call back number: [phone]    Requested Prescriptions:   Requested Prescriptions     Pending Prescriptions Disp Refills   • propranolol LA (INDERAL LA) 120 MG 24 hr capsule 90 capsule 0        Pharmacy where request should be sent: [id number] [place], [place] - [address] AT [facility] - [phone] [facility] [phone] FX     Additional details provided by patient: PATIENT WANTS TO DROP DOSE TO 80 MG SHE STATES SHE HAS BEEN WORKING OUT AND DIETING.       Does the patient have less than a 3 day supply:  [x] Yes  [] No    Natan AMADOR Rep   09/12/22 13:15 EDT

## 2022-09-12 NOTE — TELEPHONE ENCOUNTER
Rx Refill Note  Requested Prescriptions     Pending Prescriptions Disp Refills   • propranolol LA (INDERAL LA) 120 MG 24 hr capsule 90 capsule 0      Last office visit with prescribing clinician: 1/19/2022      Next office visit with prescribing clinician: Visit date not found            Natan Conroy Rep  09/12/22, 16:33 EDT

## 2022-10-12 RX ORDER — PROPRANOLOL HYDROCHLORIDE 80 MG/1
80 CAPSULE, EXTENDED RELEASE ORAL DAILY
Qty: 90 CAPSULE | Refills: 1 | OUTPATIENT
Start: 2022-10-12

## 2022-10-12 RX ORDER — PROPRANOLOL HYDROCHLORIDE 80 MG/1
80 CAPSULE, EXTENDED RELEASE ORAL DAILY
Qty: 30 CAPSULE | Refills: 3 | Status: SHIPPED | OUTPATIENT
Start: 2022-10-12 | End: 2022-12-06

## 2022-10-12 NOTE — TELEPHONE ENCOUNTER
----- Message from Shirin Hernandes sent at 10/12/2022 10:51 AM EDT -----  Regarding: Changed insurance please send refill to [facility]  Contact: [phone]  Hello! I recently changed my insurance and now need to use [facility] pharmacy and I am in need of propranolol 80 my refill. The closest to me is located at [address] [place] [address]. I am in need of a refill of propranolol 80 mg ER. Could the office please send a refill to this [facility] pharmacy today please? Thank you so much!     Shirin Hernandes

## 2022-10-12 NOTE — TELEPHONE ENCOUNTER
Rx Refill Note  Requested Prescriptions     Pending Prescriptions Disp Refills   • propranolol LA (Inderal LA) 80 MG 24 hr capsule 90 capsule 1     Sig: Take 1 capsule by mouth Daily.      Last office visit with prescribing clinician: 1/19/2022      Next office visit with prescribing clinician: Visit date not found            Shan Pacheco  10/12/22, 11:36 EDT

## 2022-10-13 RX ORDER — PROPRANOLOL HYDROCHLORIDE 80 MG/1
80 CAPSULE, EXTENDED RELEASE ORAL DAILY
Qty: 30 CAPSULE | Refills: 3 | OUTPATIENT
Start: 2022-10-13

## 2022-10-13 NOTE — TELEPHONE ENCOUNTER
Caller: Shirin Hernandes    Relationship: Self    Best call back number: [phone]    Requested Prescriptions:   Requested Prescriptions     Pending Prescriptions Disp Refills   • propranolol LA (Inderal LA) 80 MG 24 hr capsule 30 capsule 3     Sig: Take 1 capsule by mouth Daily.        Pharmacy where request should be sent: [id number] - [place], [place] - [address] [facility]. AT [facility] [phone] [facility] [phone]      Additional details provided by patient: OUT OF MEDICATION. WAS SENT TO WRONG PHARMACY.      Does the patient have less than a 3 day supply:  [x] Yes  [] No    Natan Dillon Rep   10/13/22 09:49 EDT

## 2022-11-03 ENCOUNTER — TELEPHONE (OUTPATIENT)
Dept: GASTROENTEROLOGY | Facility: CLINIC | Age: 37
End: 2022-11-03

## 2022-11-03 NOTE — TELEPHONE ENCOUNTER
----- Message from Shirin Hernandes sent at 11/2/2022 10:25 AM EDT -----  Regarding: Need PA on lialda asap please   Contact: [phone]  Hello, [facility] sent over a request for prior authorization on my lialda on Monday to you guys, can we please get this processed expedited? I am now at only half a small bottle left so I probably only have a week left of medicine, I had no idea PA was going to be required since I already had refills remaining from [facility] with my last insurance. Can we get this processed asap? Thank you so much!    Shirin Hernandes

## 2022-11-04 ENCOUNTER — TELEPHONE (OUTPATIENT)
Dept: GASTROENTEROLOGY | Facility: CLINIC | Age: 37
End: 2022-11-04

## 2022-11-04 NOTE — TELEPHONE ENCOUNTER
Caller: Shirin Hernandes    Relationship to patient: Self    Best call back number: [phone]    Patient is needing: PT CALLED IN MEDICATION WAS DENIED AND NEED TO EXPIDITE THE APPEAL & IN THE MIDST OF A FLARE CURRENTLY AND WIILL NEED THE MEDICATION MESALAMINE (LIALDA) 1.2 G EC TABLET WITH 3-4 DAYS LEFT.      CAN SOMEONE PLEASE REACH OUT TO THE PATIENT.

## 2022-11-04 NOTE — TELEPHONE ENCOUNTER
According to her insurance formulary she has to tried and failed balsalazide, mesalamine delayed release, sulfasalazine and sulfasalazine delayed release and asacol.  Can you please call her and see which of these she has not tried so we can go ahead and start having her try these so that we can get the Lialda covered if these do not work.

## 2022-11-04 NOTE — TELEPHONE ENCOUNTER
New prior authorization has been sent in via cmm. Patient has new insurance. Updated medications that patient has tried.

## 2022-11-04 NOTE — TELEPHONE ENCOUNTER
Returned call to pt.    She wants us to try and expedite this appeal.  I revied the denial letter with her and she said she has tried the following medications;    Balsalazide, mesalamine, sulfasalazine and asacol.    Can we try to appeal?  Message to MAs

## 2022-11-07 NOTE — TELEPHONE ENCOUNTER
Prior authorization was approved. Valid 11/04/2022 through 11/04/2023. Pharmacy has been notified.

## 2022-12-05 NOTE — TELEPHONE ENCOUNTER
Rx Refill Note  Requested Prescriptions     Pending Prescriptions Disp Refills   • propranolol LA (INDERAL LA) 80 MG 24 hr capsule [Pharmacy Med Name: PROPRANOLOL ER 80 MG CAPSULE] 30 capsule 3     Sig: TAKE 1 CAPSULE BY MOUTH EVERY DAY      Last office visit with prescribing clinician: 1/19/2022   Last telemedicine visit with prescribing clinician: Visit date not found   Next office visit with prescribing clinician: Visit date not found

## 2022-12-06 RX ORDER — PROPRANOLOL HYDROCHLORIDE 80 MG/1
CAPSULE, EXTENDED RELEASE ORAL
Qty: 30 CAPSULE | Refills: 3 | Status: SHIPPED | OUTPATIENT
Start: 2022-12-06 | End: 2023-03-01

## 2023-03-01 RX ORDER — PROPRANOLOL HYDROCHLORIDE 80 MG/1
CAPSULE, EXTENDED RELEASE ORAL
Qty: 90 CAPSULE | Refills: 1 | Status: SHIPPED | OUTPATIENT
Start: 2023-03-01

## 2023-03-01 NOTE — TELEPHONE ENCOUNTER
Rx Refill Note  Requested Prescriptions     Pending Prescriptions Disp Refills   • propranolol LA (INDERAL LA) 80 MG 24 hr capsule [Pharmacy Med Name: PROPRANOLOL ER 80 MG CAPSULE] 90 capsule 1     Sig: TAKE 1 CAPSULE BY MOUTH EVERY DAY      Last office visit with prescribing clinician: 1/19/2022   Last telemedicine visit with prescribing clinician: Visit date not found   Next office visit with prescribing clinician: Visit date not found       {TIP  Please add Last Relevant Lab Date if appropriate: 05/18/22                 Would you like a call back once the refill request has been completed: [] Yes [] No    If the office needs to give you a call back, can they leave a voicemail: [] Yes [] No    Gloria Moore MA  03/01/23, 07:01 EST

## (undated) DEVICE — SOL IRR H2O BTL 1000ML STRL

## (undated) DEVICE — SUT MNCRYL PLS ANTIB UD 4/0 PS2 18IN

## (undated) DEVICE — SUT VIC 4/0 KS 27IN J662H

## (undated) DEVICE — KT ART BLD GAS QUICK DRAW

## (undated) DEVICE — SUT PDS 0 CTX 36IN DYED Z370T

## (undated) DEVICE — SENSR O2 OXIMAX FNGR A/ 18IN NONSTR

## (undated) DEVICE — SUT VIC 0 CT1 36IN J946H

## (undated) DEVICE — SUT VIC 3/0 CTI 36IN J944H

## (undated) DEVICE — ADAPT CLN BIOGUARD AIR/H2O DISP

## (undated) DEVICE — CANN O2 ETCO2 FITS ALL CONN CO2 SMPL A/ 7IN DISP LF

## (undated) DEVICE — SLV SCD KN ADJ EXPRSS LG

## (undated) DEVICE — SUT GUT CHRM 0 CT 27IN 914H

## (undated) DEVICE — TUBING, SUCTION, 1/4" X 10', STRAIGHT: Brand: MEDLINE

## (undated) DEVICE — SUT MNCRYL 0/0 CTX 36IN Y398H

## (undated) DEVICE — SINGLE-USE BIOPSY FORCEPS: Brand: RADIAL JAW 4

## (undated) DEVICE — 3M(TM) TEGADERM(TM) TRANSPARENT FILM DRESSING FRAME STYLE 1627: Brand: 3M™ TEGADERM™

## (undated) DEVICE — SUT GUT CHRM 3/0 CT1 3/0 36IN 922H

## (undated) DEVICE — NDL BLNT 18G 1 1/2IN

## (undated) DEVICE — LN SMPL CO2 SHTRM SD STREAM W/M LUER

## (undated) DEVICE — SUT GUT CHRM 3/0 SH 36IN G172H

## (undated) DEVICE — ADHS SKIN DERMABOND TOPICAL HI VISC

## (undated) DEVICE — GLV SURG BIOGEL LTX PF 6 1/2

## (undated) DEVICE — SOL IRR NACL 0.9PCT BT 1000ML

## (undated) DEVICE — KT ORCA ORCAPOD DISP STRL

## (undated) DEVICE — SUT GUT PLN 0 STD TIE 54IN S104H

## (undated) DEVICE — 3M™ MEDIPORE™ H SOFT CLOTH SURGICAL TAPE 2864, 4 INCH X 10 YARD (10CM X 9,14M), 12 ROLLS/CASE: Brand: 3M™ MEDIPORE™